# Patient Record
Sex: MALE | Race: WHITE | NOT HISPANIC OR LATINO | Employment: FULL TIME | URBAN - METROPOLITAN AREA
[De-identification: names, ages, dates, MRNs, and addresses within clinical notes are randomized per-mention and may not be internally consistent; named-entity substitution may affect disease eponyms.]

---

## 2019-01-02 ENCOUNTER — OFFICE VISIT (OUTPATIENT)
Dept: URGENT CARE | Facility: CLINIC | Age: 54
End: 2019-01-02
Payer: COMMERCIAL

## 2019-01-02 VITALS
BODY MASS INDEX: 25.46 KG/M2 | HEIGHT: 68 IN | WEIGHT: 168 LBS | RESPIRATION RATE: 16 BRPM | DIASTOLIC BLOOD PRESSURE: 96 MMHG | OXYGEN SATURATION: 100 % | HEART RATE: 70 BPM | SYSTOLIC BLOOD PRESSURE: 145 MMHG | TEMPERATURE: 97 F

## 2019-01-02 DIAGNOSIS — J01.90 ACUTE NON-RECURRENT SINUSITIS, UNSPECIFIED LOCATION: Primary | ICD-10-CM

## 2019-01-02 PROCEDURE — 99204 PR OFFICE/OUTPT VISIT, NEW, LEVL IV, 45-59 MIN: ICD-10-PCS | Mod: 25,S$GLB,, | Performed by: NURSE PRACTITIONER

## 2019-01-02 PROCEDURE — 99204 OFFICE O/P NEW MOD 45 MIN: CPT | Mod: 25,S$GLB,, | Performed by: NURSE PRACTITIONER

## 2019-01-02 PROCEDURE — 96372 THER/PROPH/DIAG INJ SC/IM: CPT | Mod: S$GLB,,, | Performed by: NURSE PRACTITIONER

## 2019-01-02 PROCEDURE — 96372 PR INJECTION,THERAP/PROPH/DIAG2ST, IM OR SUBCUT: ICD-10-PCS | Mod: S$GLB,,, | Performed by: NURSE PRACTITIONER

## 2019-01-02 RX ORDER — PROMETHAZINE HYDROCHLORIDE AND DEXTROMETHORPHAN HYDROBROMIDE 6.25; 15 MG/5ML; MG/5ML
5 SYRUP ORAL EVERY 4 HOURS PRN
Qty: 240 ML | Refills: 0 | Status: SHIPPED | OUTPATIENT
Start: 2019-01-02 | End: 2019-01-12

## 2019-01-02 RX ORDER — PREDNISONE 20 MG/1
20 TABLET ORAL 2 TIMES DAILY
Qty: 10 TABLET | Refills: 0 | Status: SHIPPED | OUTPATIENT
Start: 2019-01-02 | End: 2019-01-07

## 2019-01-02 RX ORDER — PSEUDOEPHEDRINE HCL 120 MG/1
120 TABLET, FILM COATED, EXTENDED RELEASE ORAL
COMMUNITY
End: 2022-08-31

## 2019-01-02 RX ORDER — CEFDINIR 300 MG/1
300 CAPSULE ORAL 2 TIMES DAILY
Qty: 20 CAPSULE | Refills: 0 | Status: SHIPPED | OUTPATIENT
Start: 2019-01-02 | End: 2019-01-12

## 2019-01-02 RX ORDER — FLUTICASONE PROPIONATE 50 MCG
2 SPRAY, SUSPENSION (ML) NASAL 2 TIMES DAILY
Qty: 1 BOTTLE | Refills: 0 | Status: SHIPPED | OUTPATIENT
Start: 2019-01-02 | End: 2022-08-31

## 2019-01-02 RX ORDER — BENAZEPRIL HYDROCHLORIDE 20 MG/1
20 TABLET ORAL DAILY
COMMUNITY
End: 2022-11-02 | Stop reason: SDUPTHER

## 2019-01-02 RX ORDER — DEXAMETHASONE SODIUM PHOSPHATE 4 MG/ML
8 INJECTION, SOLUTION INTRA-ARTICULAR; INTRALESIONAL; INTRAMUSCULAR; INTRAVENOUS; SOFT TISSUE
Status: COMPLETED | OUTPATIENT
Start: 2019-01-02 | End: 2019-01-02

## 2019-01-02 RX ORDER — CETIRIZINE HYDROCHLORIDE 10 MG/1
10 TABLET ORAL DAILY
Qty: 30 TABLET | Refills: 2 | Status: SHIPPED | OUTPATIENT
Start: 2019-01-02 | End: 2022-08-31

## 2019-01-02 RX ADMIN — DEXAMETHASONE SODIUM PHOSPHATE 8 MG: 4 INJECTION, SOLUTION INTRA-ARTICULAR; INTRALESIONAL; INTRAMUSCULAR; INTRAVENOUS; SOFT TISSUE at 11:01

## 2019-01-02 NOTE — PROGRESS NOTES
"Subjective:       Patient ID: Marjorie Hopper is a 53 y.o. male.    Vitals:  height is 5' 8" (1.727 m) and weight is 76.2 kg (168 lb). His oral temperature is 97.4 °F (36.3 °C). His blood pressure is 145/96 (abnormal) and his pulse is 70. His respiration is 16 and oxygen saturation is 100%.     Chief Complaint: Sinus Problem    Sinus Problem   This is a new problem. The current episode started 1 to 4 weeks ago. The problem has been gradually worsening since onset. There has been no fever. Associated symptoms include congestion, coughing, headaches, sinus pressure and sneezing. Pertinent negatives include no chills, shortness of breath or sore throat. Treatments tried: dayquil, sudafed. The treatment provided no relief.       Constitution: Negative for chills, fatigue and fever.   HENT: Positive for congestion, postnasal drip, sinus pain and sinus pressure. Negative for sore throat.    Neck: Negative for painful lymph nodes.   Cardiovascular: Negative for chest pain and leg swelling.   Eyes: Negative for double vision and blurred vision.   Respiratory: Positive for cough. Negative for shortness of breath.    Gastrointestinal: Negative for nausea, vomiting and diarrhea.   Genitourinary: Negative for dysuria, frequency and urgency.   Musculoskeletal: Negative for joint pain, joint swelling, muscle cramps and muscle ache.   Skin: Negative for color change, pale and rash.   Allergic/Immunologic: Positive for sneezing. Negative for seasonal allergies.   Neurological: Positive for headaches. Negative for dizziness, history of vertigo, light-headedness and passing out.   Hematologic/Lymphatic: Negative for swollen lymph nodes, easy bruising/bleeding and history of blood clots. Does not bruise/bleed easily.   Psychiatric/Behavioral: Negative for nervous/anxious, sleep disturbance and depression. The patient is not nervous/anxious.        Objective:      Physical Exam   Constitutional: He is oriented to person, place, and time. " He appears well-developed and well-nourished. He is active and cooperative.   HENT:   Head: Normocephalic and atraumatic.   Right Ear: Hearing, external ear and ear canal normal. A middle ear effusion is present.   Left Ear: Hearing, external ear and ear canal normal. A middle ear effusion is present.   Nose: Mucosal edema and rhinorrhea present. Right sinus exhibits maxillary sinus tenderness. Left sinus exhibits maxillary sinus tenderness.   Mouth/Throat: Uvula is midline and mucous membranes are normal. Posterior oropharyngeal erythema present.   Eyes: Conjunctivae, EOM and lids are normal. Pupils are equal, round, and reactive to light.   Neck: Trachea normal, normal range of motion and phonation normal. Neck supple.   Cardiovascular: Normal rate, regular rhythm, normal heart sounds, intact distal pulses and normal pulses.   Pulmonary/Chest: Effort normal and breath sounds normal.   Abdominal: Soft. Bowel sounds are normal.   Musculoskeletal: Normal range of motion.   Neurological: He is alert and oriented to person, place, and time. He has normal strength. GCS eye subscore is 4. GCS verbal subscore is 5. GCS motor subscore is 6.   Skin: Skin is warm, dry and intact.   Psychiatric: He has a normal mood and affect. His behavior is normal. Judgment and thought content normal.   Nursing note and vitals reviewed.      Assessment:       1. Acute non-recurrent sinusitis, unspecified location        Plan:         Acute non-recurrent sinusitis, unspecified location  -     dexamethasone injection 8 mg  -     cefdinir (OMNICEF) 300 MG capsule; Take 1 capsule (300 mg total) by mouth 2 (two) times daily. for 10 days  Dispense: 20 capsule; Refill: 0  -     cetirizine (ZYRTEC) 10 MG tablet; Take 1 tablet (10 mg total) by mouth once daily.  Dispense: 30 tablet; Refill: 2  -     fluticasone (FLONASE) 50 mcg/actuation nasal spray; 2 sprays (100 mcg total) by Each Nare route 2 (two) times daily.  Dispense: 1 Bottle; Refill: 0  -      predniSONE (DELTASONE) 20 MG tablet; Take 1 tablet (20 mg total) by mouth 2 (two) times daily. for 5 days  Dispense: 10 tablet; Refill: 0  -     promethazine-dextromethorphan (PROMETHAZINE-DM) 6.25-15 mg/5 mL Syrp; Take 5 mLs by mouth every 4 (four) hours as needed.  Dispense: 240 mL; Refill: 0

## 2022-06-01 ENCOUNTER — TELEPHONE (OUTPATIENT)
Dept: SPORTS MEDICINE | Facility: CLINIC | Age: 57
End: 2022-06-01
Payer: COMMERCIAL

## 2022-06-01 ENCOUNTER — TELEPHONE (OUTPATIENT)
Dept: ORTHOPEDICS | Facility: CLINIC | Age: 57
End: 2022-06-01
Payer: COMMERCIAL

## 2022-06-01 DIAGNOSIS — M25.561 RIGHT KNEE PAIN, UNSPECIFIED CHRONICITY: Primary | ICD-10-CM

## 2022-06-01 NOTE — TELEPHONE ENCOUNTER
[facility] for patient Marjorie to call the office, just returning a call.    ----- Message from Renate Godfrey sent at 6/1/2022  4:06 PM CDT -----  Regarding: returning nurse call  Contact: patient  [phone]  please call patient returning call to the nurse concerning x-rays waiting on a call back thanks.

## 2022-06-06 ENCOUNTER — OFFICE VISIT (OUTPATIENT)
Dept: ORTHOPEDICS | Facility: CLINIC | Age: 57
End: 2022-06-06
Payer: COMMERCIAL

## 2022-06-06 VITALS
DIASTOLIC BLOOD PRESSURE: 85 MMHG | HEART RATE: 104 BPM | HEIGHT: 68 IN | SYSTOLIC BLOOD PRESSURE: 136 MMHG | BODY MASS INDEX: 27.13 KG/M2 | WEIGHT: 179 LBS

## 2022-06-06 DIAGNOSIS — M17.11 PRIMARY OSTEOARTHRITIS OF RIGHT KNEE: Primary | ICD-10-CM

## 2022-06-06 DIAGNOSIS — M25.561 RIGHT KNEE PAIN, UNSPECIFIED CHRONICITY: ICD-10-CM

## 2022-06-06 PROCEDURE — 4010F ACE/ARB THERAPY RXD/TAKEN: CPT | Mod: CPTII,S$GLB,, | Performed by: PHYSICIAN ASSISTANT

## 2022-06-06 PROCEDURE — 3079F DIAST BP 80-89 MM HG: CPT | Mod: CPTII,S$GLB,, | Performed by: PHYSICIAN ASSISTANT

## 2022-06-06 PROCEDURE — 99204 PR OFFICE/OUTPT VISIT, NEW, LEVL IV, 45-59 MIN: ICD-10-PCS | Mod: S$GLB,,, | Performed by: PHYSICIAN ASSISTANT

## 2022-06-06 PROCEDURE — 1159F PR MEDICATION LIST DOCUMENTED IN MEDICAL RECORD: ICD-10-PCS | Mod: CPTII,S$GLB,, | Performed by: PHYSICIAN ASSISTANT

## 2022-06-06 PROCEDURE — 1160F RVW MEDS BY RX/DR IN RCRD: CPT | Mod: CPTII,S$GLB,, | Performed by: PHYSICIAN ASSISTANT

## 2022-06-06 PROCEDURE — 3075F SYST BP GE 130 - 139MM HG: CPT | Mod: CPTII,S$GLB,, | Performed by: PHYSICIAN ASSISTANT

## 2022-06-06 PROCEDURE — 99999 PR PBB SHADOW E&M-EST. PATIENT-LVL III: CPT | Mod: PBBFAC,,, | Performed by: PHYSICIAN ASSISTANT

## 2022-06-06 PROCEDURE — 3079F PR MOST RECENT DIASTOLIC BLOOD PRESSURE 80-89 MM HG: ICD-10-PCS | Mod: CPTII,S$GLB,, | Performed by: PHYSICIAN ASSISTANT

## 2022-06-06 PROCEDURE — 1160F PR REVIEW ALL MEDS BY PRESCRIBER/CLIN PHARMACIST DOCUMENTED: ICD-10-PCS | Mod: CPTII,S$GLB,, | Performed by: PHYSICIAN ASSISTANT

## 2022-06-06 PROCEDURE — 99204 OFFICE O/P NEW MOD 45 MIN: CPT | Mod: S$GLB,,, | Performed by: PHYSICIAN ASSISTANT

## 2022-06-06 PROCEDURE — 3008F PR BODY MASS INDEX (BMI) DOCUMENTED: ICD-10-PCS | Mod: CPTII,S$GLB,, | Performed by: PHYSICIAN ASSISTANT

## 2022-06-06 PROCEDURE — 3075F PR MOST RECENT SYSTOLIC BLOOD PRESS GE 130-139MM HG: ICD-10-PCS | Mod: CPTII,S$GLB,, | Performed by: PHYSICIAN ASSISTANT

## 2022-06-06 PROCEDURE — 4010F PR ACE/ARB THEARPY RXD/TAKEN: ICD-10-PCS | Mod: CPTII,S$GLB,, | Performed by: PHYSICIAN ASSISTANT

## 2022-06-06 PROCEDURE — 3008F BODY MASS INDEX DOCD: CPT | Mod: CPTII,S$GLB,, | Performed by: PHYSICIAN ASSISTANT

## 2022-06-06 PROCEDURE — 1159F MED LIST DOCD IN RCRD: CPT | Mod: CPTII,S$GLB,, | Performed by: PHYSICIAN ASSISTANT

## 2022-06-06 PROCEDURE — 99999 PR PBB SHADOW E&M-EST. PATIENT-LVL III: ICD-10-PCS | Mod: PBBFAC,,, | Performed by: PHYSICIAN ASSISTANT

## 2022-06-06 RX ORDER — MELOXICAM 15 MG/1
15 TABLET ORAL DAILY
Qty: 30 TABLET | Refills: 2 | Status: SHIPPED | OUTPATIENT
Start: 2022-06-06 | End: 2023-01-23

## 2022-06-06 NOTE — PROGRESS NOTES
Subjective:          Chief Complaint: Marjorie Hopper is a 56 y.o. male who had concerns including Pain of the Right Knee.    Marjorie Hopper works as a  at [employer]. The intermittent pain started 4 months ago with no LUIGI and is becoming progressively worse. Pain is located over (points to) medial joint line, anterior and posterior right knee. He reports that the pain is a 5 /10 aching and throbbing pain today and not responding adequately to conservative measures which have included activity modifications, rest, and oral medication. Is affecting ADLs and limiting desired level of activity. Denies numbness, tingling, radiation and inability to bear weight.  Pain is 7 /10 at its worst    Mechanical symptoms: none  Subjective instability: (--)   Worse with: increased activity and at the end of the day  Better with rest.   Nocturnal symptoms: (--)    No previous surgeries or recent trauma on bilateral knees            Review of Systems   Constitutional: Negative for chills and fever.   HENT: Negative for congestion and sore throat.    Eyes: Negative for discharge and double vision.   Cardiovascular: Negative for chest pain, palpitations and syncope.   Respiratory: Negative for cough and shortness of breath.    Endocrine: Negative for cold intolerance and heat intolerance.   Skin: Negative for dry skin and rash.   Musculoskeletal: Positive for joint pain.   Gastrointestinal: Negative for abdominal pain, nausea and vomiting.   Neurological: Negative for focal weakness, numbness and paresthesias.                   Objective:        General: Marjorie is well-developed, well-nourished, appears stated age, in no acute distress, alert and oriented to time, place and person.     General    Nursing note and vitals reviewed.  Constitutional: He is oriented to person, place, and time. He appears well-developed and well-nourished. No distress.   Eyes: Conjunctivae and EOM are normal. Pupils are equal, round, and  reactive to light.   Neck: Neck supple. No JVD present.   Cardiovascular: Normal heart sounds and intact distal pulses.    No murmur heard.  Pulmonary/Chest: Effort normal and breath sounds normal. No respiratory distress.   Abdominal: Soft. Bowel sounds are normal. He exhibits no distension. There is no abdominal tenderness.   Neurological: He is alert and oriented to person, place, and time. Coordination normal.   Psychiatric: He has a normal mood and affect. His behavior is normal. Judgment and thought content normal.     General Musculoskeletal Exam   Gait: normal       Right Knee Exam     Inspection   Erythema: absent  Scars: absent  Swelling: absent  Effusion: absent  Deformity: absent  Bruising: absent    Tenderness   The patient is tender to palpation of the patella and medial joint line (superior, VMO).    Range of Motion   Extension: 0   Flexion:  130 (+pain) abnormal     Tests   Meniscus   Chapo:  Medial - negative Lateral - negative  Ligament Examination Lachman: normal (-1 to 2mm) PCL-Posterior Drawer: normal (0 to 2mm)     MCL - Valgus: normal (0 to 2mm)  LCL - Varus: normalDial Test at 90 degrees: normal (< 5 degrees)  Posterolateral Corner: stable  Patella   Patellar Glide (quadrants): Lateral - 1   Medial - 2  Patellar Grind: negative    Other   Popliteal (Baker's) Cyst: absent  Sensation: normal    Left Knee Exam     Inspection   Erythema: absent  Scars: absent  Swelling: absent  Effusion: absent  Deformity: absent  Bruising: absent    Tenderness   The patient is experiencing no tenderness.     Range of Motion   Extension: 0   Flexion: 130     Tests   Meniscus   Chapo:  Medial - negative Lateral - negative  Stability Lachman: normal (-1 to 2mm) PCL-Posterior Drawer: normal (0 to 2mm)  MCL - Valgus: normal (0 to 2mm)  LCL - Varus: normal (0 to 2mm)Dial Test at 90 degrees: normal (< 5 degrees)  Posterolateral Corner: stable  Patella   Patellar Glide (Quadrants): Lateral - 1 Medial - 2  Patellar  Grind: negative    Other   Popliteal (Baker's) Cyst: absent  Sensation: normal    Right Hip Exam     Tests   Blanche: negative  Left Hip Exam     Tests   Blanche: negative          Muscle Strength   Right Lower Extremity   Hip Abduction: 5/5   Quadriceps:  4/5 (mild atrophy)   Hamstrin/5   Left Lower Extremity   Hip Abduction: 5/5   Quadriceps:  4/5 (mild atrophy)   Hamstrin/5     Vascular Exam     Right Pulses  Dorsalis Pedis:      2+  Posterior Tibial:      2+        Left Pulses  Dorsalis Pedis:      2+  Posterior Tibial:      2+        Edema  Right Lower Leg: absent  Left Lower Leg: absent      Radiographic Findings 2022:    Knee joint cartilage spaces appear fairly well maintained for age.  No advanced degenerative abnormality.  Mild enthesopathic change at the bilateral quadriceps insertion.  No acute fracture, dislocation, or osseous destruction.    Kellgren-Rosendo Classification: 2    Xrays of the knees were ordered and reviewed by me today. These findings were discussed and reviewed with the patient.          Assessment:       Encounter Diagnoses   Name Primary?    Primary osteoarthritis of right knee Yes    Right knee pain, unspecified chronicity           Plan:       We have discussed a variety of treatment options including medications, injections, physical therapy and other alternative treatments. I also explained the indications, risks and benefits of surgery. Given the patients hx and examination today, I believe he would benefit from physical therapy. Pt agrees and would like to proceed with physical therapy.    I made the decision to obtain old records of the patient including previous notes and imaging. I independently reviewed and interpreted lab results today as well as prior imaging.      1. Mobic 15 mg 1 time daily PRN for pain management. Patient understands to take with food and/or OTC prilosec to decrease GI side effects.  2. Ambulatory referral to physical therapy for  patellofemoral strengthening and conditioning.  3. Ice compress to the affected area 2-3x a day for 15-20 minutes as needed for pain management.  4. RTC to see Leighton Oviedo PA-C in 8 weeks for follow-up.      All of the patient's questions were answered and the patient will contact us if they have any questions or concerns in the interim.                Patient questionnaires may have been collected.

## 2022-06-15 PROBLEM — M17.11 PRIMARY OSTEOARTHRITIS OF RIGHT KNEE: Status: ACTIVE | Noted: 2022-06-15

## 2022-06-15 PROBLEM — M25.561 RIGHT KNEE PAIN: Status: ACTIVE | Noted: 2022-06-15

## 2022-08-09 ENCOUNTER — OFFICE VISIT (OUTPATIENT)
Dept: PODIATRY | Facility: CLINIC | Age: 57
End: 2022-08-09
Payer: COMMERCIAL

## 2022-08-09 VITALS
DIASTOLIC BLOOD PRESSURE: 86 MMHG | HEART RATE: 92 BPM | BODY MASS INDEX: 27.52 KG/M2 | WEIGHT: 181 LBS | SYSTOLIC BLOOD PRESSURE: 139 MMHG

## 2022-08-09 DIAGNOSIS — M79.671 ARCH PAIN, RIGHT: ICD-10-CM

## 2022-08-09 DIAGNOSIS — M12.861: Primary | ICD-10-CM

## 2022-08-09 PROCEDURE — 3079F PR MOST RECENT DIASTOLIC BLOOD PRESSURE 80-89 MM HG: ICD-10-PCS | Mod: CPTII,S$GLB,, | Performed by: PODIATRIST

## 2022-08-09 PROCEDURE — 3075F PR MOST RECENT SYSTOLIC BLOOD PRESS GE 130-139MM HG: ICD-10-PCS | Mod: CPTII,S$GLB,, | Performed by: PODIATRIST

## 2022-08-09 PROCEDURE — 99203 OFFICE O/P NEW LOW 30 MIN: CPT | Mod: S$GLB,,, | Performed by: PODIATRIST

## 2022-08-09 PROCEDURE — 1159F PR MEDICATION LIST DOCUMENTED IN MEDICAL RECORD: ICD-10-PCS | Mod: CPTII,S$GLB,, | Performed by: PODIATRIST

## 2022-08-09 PROCEDURE — 99999 PR PBB SHADOW E&M-EST. PATIENT-LVL III: ICD-10-PCS | Mod: PBBFAC,,, | Performed by: PODIATRIST

## 2022-08-09 PROCEDURE — 4010F ACE/ARB THERAPY RXD/TAKEN: CPT | Mod: CPTII,S$GLB,, | Performed by: PODIATRIST

## 2022-08-09 PROCEDURE — 4010F PR ACE/ARB THEARPY RXD/TAKEN: ICD-10-PCS | Mod: CPTII,S$GLB,, | Performed by: PODIATRIST

## 2022-08-09 PROCEDURE — 99203 PR OFFICE/OUTPT VISIT, NEW, LEVL III, 30-44 MIN: ICD-10-PCS | Mod: S$GLB,,, | Performed by: PODIATRIST

## 2022-08-09 PROCEDURE — 3008F PR BODY MASS INDEX (BMI) DOCUMENTED: ICD-10-PCS | Mod: CPTII,S$GLB,, | Performed by: PODIATRIST

## 2022-08-09 PROCEDURE — 1160F RVW MEDS BY RX/DR IN RCRD: CPT | Mod: CPTII,S$GLB,, | Performed by: PODIATRIST

## 2022-08-09 PROCEDURE — 3079F DIAST BP 80-89 MM HG: CPT | Mod: CPTII,S$GLB,, | Performed by: PODIATRIST

## 2022-08-09 PROCEDURE — 3075F SYST BP GE 130 - 139MM HG: CPT | Mod: CPTII,S$GLB,, | Performed by: PODIATRIST

## 2022-08-09 PROCEDURE — 1160F PR REVIEW ALL MEDS BY PRESCRIBER/CLIN PHARMACIST DOCUMENTED: ICD-10-PCS | Mod: CPTII,S$GLB,, | Performed by: PODIATRIST

## 2022-08-09 PROCEDURE — 1159F MED LIST DOCD IN RCRD: CPT | Mod: CPTII,S$GLB,, | Performed by: PODIATRIST

## 2022-08-09 PROCEDURE — 3008F BODY MASS INDEX DOCD: CPT | Mod: CPTII,S$GLB,, | Performed by: PODIATRIST

## 2022-08-09 PROCEDURE — 99999 PR PBB SHADOW E&M-EST. PATIENT-LVL III: CPT | Mod: PBBFAC,,, | Performed by: PODIATRIST

## 2022-08-09 RX ORDER — DICLOFENAC SODIUM 10 MG/G
2 GEL TOPICAL 4 TIMES DAILY
Qty: 350 G | Refills: 2 | Status: SHIPPED | OUTPATIENT
Start: 2022-08-09 | End: 2022-08-31

## 2022-08-09 NOTE — PROGRESS NOTES
Subjective:      Patient ID: Marjorie Hopper is a 56 y.o. male.    Chief Complaint: Foot Problem (Arch pain)    Marjorie is a 56 y.o. male who presents to the podiatry clinic  with complaint of  right lower extremity concern - no consistent foot pain but told it rolls in, affecting knees. Also, has been doing PT for R knee pain & developed occasional R arch pain recently. Onset of the symptoms was couple of yrs.knee pain. Current symptoms (only to knees) include: ability to bear weight, but with some pain and worsening symptoms after a period of activity. Symptoms have waxed and waned but are worse overall. Patient has had no prior known foot problems. Evaluation to date: seen for knees & PT 6 wks., then referred here.     Past Medical History:   Diagnosis Date    Hypertension      Patient Active Problem List   Diagnosis    Right knee pain    Primary osteoarthritis of right knee     PCP: Anatoliy Mares MD - 6/9/22 - since, has left so needs new PCP     Objective:      Review of Systems   Constitutional: Negative for malaise/fatigue.   Cardiovascular: Negative for claudication and leg swelling.   Skin: Negative for color change and suspicious lesions.   Musculoskeletal: Positive for joint pain and myalgias. Negative for back pain, falls, joint swelling, muscle cramps and muscle weakness.   Neurological: Negative for focal weakness, numbness, paresthesias, sensory change and weakness.   Psychiatric/Behavioral: The patient is not nervous/anxious.      Physical Exam  Vitals reviewed.   Constitutional:       General: He is not in acute distress.     Appearance: Normal appearance. He is well-developed and overweight.   Cardiovascular:      Pulses: Normal pulses.           Dorsalis pedis pulses are 2+ on the right side and 2+ on the left side.   Musculoskeletal:         General: No swelling, tenderness or signs of injury.      Right lower leg: No edema.      Left lower leg: No edema.      Right foot: Deformity present.       Left foot: Deformity (flexible cavus B/L) present.   Feet:      Right foot:      Skin integrity: Skin integrity normal.      Left foot:      Skin integrity: Skin integrity normal.      Comments: Equinus B/L ankles with < 90 deg foot to leg noted with knees extended.  Musculoskeletal strength of extrinsics to foot and ankle B/L + 5/5 in DF/PF/Inv/Ev to resistance with no reproduction of pain in any direction, including B/L PT tendon.   Passive ROM of ankle and foot joints is painless and w/out crepitation B/L.  Some reduction in MLA contour w/ axial loading & WB such that there is slight heel valgus.  No pain w/ palpation plantar fascial insertion into calcaneus nor along course of plantar fascia R foot; minimal palpable edema planter central heel.      Skin:     General: Skin is dry.      Capillary Refill: Capillary refill takes less than 2 seconds.      Findings: No bruising, erythema, lesion or rash.   Neurological:      Mental Status: He is alert and oriented to person, place, and time.      Sensory: No sensory deficit.      Motor: No weakness.      Gait: Gait normal.   Psychiatric:         Mood and Affect: Mood and affect normal.         Behavior: Behavior normal. Behavior is cooperative.         Assessment:      Encounter Diagnoses   Name Primary?    Transient arthropathy, lower leg, right Yes    Arch pain, right        Plan:       Marjorie was seen today for foot problem.    Diagnoses and all orders for this visit:    Transient arthropathy, lower leg, right  -     diclofenac sodium (VOLTAREN) 1 % Gel; Apply 2 g topically 4 (four) times daily.    Arch pain, right    I counseled the patient on his conditions, their implications and medical management.     Discussed general issues surrounding flexible high arch feet of various tx strategies.  Discussed shoe choice.  Discussed non-prescription inserts &/or orthotics.     - Shoe inspection. Patient instructed on proper supportive shoe gear, never walking without  protective shoe gear.    May purchase [facility] Spenco arch orthotics or like for cavus foot type.    PPT arch pad & 1/2 B/L added to current shoes.    Instructions on shoe insert type, pads provided.    Return prn.

## 2022-08-09 NOTE — PATIENT INSTRUCTIONS
Spenco arch orthotics - 3/4 length or equivalent for high arch/plantar fasciitis    PPT arch pads w/ adhesive - medium & a portion of the small to fill arch.

## 2022-08-31 ENCOUNTER — OFFICE VISIT (OUTPATIENT)
Dept: PRIMARY CARE CLINIC | Facility: CLINIC | Age: 57
End: 2022-08-31
Payer: COMMERCIAL

## 2022-08-31 VITALS
HEIGHT: 68 IN | HEART RATE: 82 BPM | RESPIRATION RATE: 16 BRPM | TEMPERATURE: 98 F | BODY MASS INDEX: 27.52 KG/M2 | SYSTOLIC BLOOD PRESSURE: 130 MMHG | DIASTOLIC BLOOD PRESSURE: 84 MMHG | WEIGHT: 181.56 LBS | OXYGEN SATURATION: 98 %

## 2022-08-31 DIAGNOSIS — G93.31 POSTVIRAL SYNDROME: ICD-10-CM

## 2022-08-31 DIAGNOSIS — R05.9 COUGH: ICD-10-CM

## 2022-08-31 DIAGNOSIS — E78.5 HYPERLIPIDEMIA, UNSPECIFIED HYPERLIPIDEMIA TYPE: ICD-10-CM

## 2022-08-31 DIAGNOSIS — G89.29 CHRONIC LUQ PAIN: ICD-10-CM

## 2022-08-31 DIAGNOSIS — Z13.6 SCREENING FOR CARDIOVASCULAR CONDITION: ICD-10-CM

## 2022-08-31 DIAGNOSIS — Z11.4 ENCOUNTER FOR SCREENING FOR HIV: ICD-10-CM

## 2022-08-31 DIAGNOSIS — Z76.89 ENCOUNTER TO ESTABLISH CARE: Primary | ICD-10-CM

## 2022-08-31 DIAGNOSIS — Z11.59 NEED FOR HEPATITIS C SCREENING TEST: ICD-10-CM

## 2022-08-31 DIAGNOSIS — Z23 NEED FOR VACCINATION: ICD-10-CM

## 2022-08-31 DIAGNOSIS — Z87.891 FORMER TOBACCO USE: ICD-10-CM

## 2022-08-31 DIAGNOSIS — R10.12 CHRONIC LUQ PAIN: ICD-10-CM

## 2022-08-31 DIAGNOSIS — K21.9 GASTROESOPHAGEAL REFLUX DISEASE, UNSPECIFIED WHETHER ESOPHAGITIS PRESENT: ICD-10-CM

## 2022-08-31 PROBLEM — I73.00 RAYNAUD'S PHENOMENON: Status: ACTIVE | Noted: 2020-08-04

## 2022-08-31 PROBLEM — F41.9 ANXIETY DISORDER: Status: ACTIVE | Noted: 2022-08-31

## 2022-08-31 PROBLEM — I10 ESSENTIAL HYPERTENSION: Status: ACTIVE | Noted: 2022-08-31

## 2022-08-31 PROCEDURE — 1159F MED LIST DOCD IN RCRD: CPT | Mod: CPTII,S$GLB,, | Performed by: STUDENT IN AN ORGANIZED HEALTH CARE EDUCATION/TRAINING PROGRAM

## 2022-08-31 PROCEDURE — 99204 PR OFFICE/OUTPT VISIT, NEW, LEVL IV, 45-59 MIN: ICD-10-PCS | Mod: S$GLB,,, | Performed by: STUDENT IN AN ORGANIZED HEALTH CARE EDUCATION/TRAINING PROGRAM

## 2022-08-31 PROCEDURE — 99999 PR PBB SHADOW E&M-EST. PATIENT-LVL V: CPT | Mod: PBBFAC,,, | Performed by: STUDENT IN AN ORGANIZED HEALTH CARE EDUCATION/TRAINING PROGRAM

## 2022-08-31 PROCEDURE — 3079F DIAST BP 80-89 MM HG: CPT | Mod: CPTII,S$GLB,, | Performed by: STUDENT IN AN ORGANIZED HEALTH CARE EDUCATION/TRAINING PROGRAM

## 2022-08-31 PROCEDURE — 3075F PR MOST RECENT SYSTOLIC BLOOD PRESS GE 130-139MM HG: ICD-10-PCS | Mod: CPTII,S$GLB,, | Performed by: STUDENT IN AN ORGANIZED HEALTH CARE EDUCATION/TRAINING PROGRAM

## 2022-08-31 PROCEDURE — 1160F RVW MEDS BY RX/DR IN RCRD: CPT | Mod: CPTII,S$GLB,, | Performed by: STUDENT IN AN ORGANIZED HEALTH CARE EDUCATION/TRAINING PROGRAM

## 2022-08-31 PROCEDURE — 3008F BODY MASS INDEX DOCD: CPT | Mod: CPTII,S$GLB,, | Performed by: STUDENT IN AN ORGANIZED HEALTH CARE EDUCATION/TRAINING PROGRAM

## 2022-08-31 PROCEDURE — 93010 EKG 12-LEAD: ICD-10-PCS | Mod: S$GLB,,, | Performed by: INTERNAL MEDICINE

## 2022-08-31 PROCEDURE — 3075F SYST BP GE 130 - 139MM HG: CPT | Mod: CPTII,S$GLB,, | Performed by: STUDENT IN AN ORGANIZED HEALTH CARE EDUCATION/TRAINING PROGRAM

## 2022-08-31 PROCEDURE — 99204 OFFICE O/P NEW MOD 45 MIN: CPT | Mod: S$GLB,,, | Performed by: STUDENT IN AN ORGANIZED HEALTH CARE EDUCATION/TRAINING PROGRAM

## 2022-08-31 PROCEDURE — 3008F PR BODY MASS INDEX (BMI) DOCUMENTED: ICD-10-PCS | Mod: CPTII,S$GLB,, | Performed by: STUDENT IN AN ORGANIZED HEALTH CARE EDUCATION/TRAINING PROGRAM

## 2022-08-31 PROCEDURE — 1160F PR REVIEW ALL MEDS BY PRESCRIBER/CLIN PHARMACIST DOCUMENTED: ICD-10-PCS | Mod: CPTII,S$GLB,, | Performed by: STUDENT IN AN ORGANIZED HEALTH CARE EDUCATION/TRAINING PROGRAM

## 2022-08-31 PROCEDURE — 99999 PR PBB SHADOW E&M-EST. PATIENT-LVL V: ICD-10-PCS | Mod: PBBFAC,,, | Performed by: STUDENT IN AN ORGANIZED HEALTH CARE EDUCATION/TRAINING PROGRAM

## 2022-08-31 PROCEDURE — 4010F ACE/ARB THERAPY RXD/TAKEN: CPT | Mod: CPTII,S$GLB,, | Performed by: STUDENT IN AN ORGANIZED HEALTH CARE EDUCATION/TRAINING PROGRAM

## 2022-08-31 PROCEDURE — 1159F PR MEDICATION LIST DOCUMENTED IN MEDICAL RECORD: ICD-10-PCS | Mod: CPTII,S$GLB,, | Performed by: STUDENT IN AN ORGANIZED HEALTH CARE EDUCATION/TRAINING PROGRAM

## 2022-08-31 PROCEDURE — 3079F PR MOST RECENT DIASTOLIC BLOOD PRESSURE 80-89 MM HG: ICD-10-PCS | Mod: CPTII,S$GLB,, | Performed by: STUDENT IN AN ORGANIZED HEALTH CARE EDUCATION/TRAINING PROGRAM

## 2022-08-31 PROCEDURE — 93010 ELECTROCARDIOGRAM REPORT: CPT | Mod: S$GLB,,, | Performed by: INTERNAL MEDICINE

## 2022-08-31 PROCEDURE — 93005 EKG 12-LEAD: ICD-10-PCS | Mod: S$GLB,,, | Performed by: STUDENT IN AN ORGANIZED HEALTH CARE EDUCATION/TRAINING PROGRAM

## 2022-08-31 PROCEDURE — 93005 ELECTROCARDIOGRAM TRACING: CPT | Mod: S$GLB,,, | Performed by: STUDENT IN AN ORGANIZED HEALTH CARE EDUCATION/TRAINING PROGRAM

## 2022-08-31 PROCEDURE — 4010F PR ACE/ARB THEARPY RXD/TAKEN: ICD-10-PCS | Mod: CPTII,S$GLB,, | Performed by: STUDENT IN AN ORGANIZED HEALTH CARE EDUCATION/TRAINING PROGRAM

## 2022-08-31 RX ORDER — ZOSTER VACCINE RECOMBINANT, ADJUVANTED 50 MCG/0.5
0.5 KIT INTRAMUSCULAR ONCE
Qty: 1 EACH | Refills: 0 | Status: SHIPPED | OUTPATIENT
Start: 2022-08-31 | End: 2022-08-31

## 2022-08-31 RX ORDER — CODEINE PHOSPHATE AND GUAIFENESIN 10; 100 MG/5ML; MG/5ML
5 SOLUTION ORAL 3 TIMES DAILY PRN
Qty: 180 ML | Refills: 0 | Status: SHIPPED | OUTPATIENT
Start: 2022-08-31 | End: 2023-02-23

## 2022-08-31 RX ORDER — BENZONATATE 100 MG/1
100 CAPSULE ORAL 3 TIMES DAILY PRN
Qty: 50 CAPSULE | Refills: 0 | Status: SHIPPED | OUTPATIENT
Start: 2022-08-31 | End: 2022-09-10

## 2022-08-31 NOTE — PROGRESS NOTES
"Subjective:           Patient ID: Marjorie Hopper is a 56 y.o. male who presents today with a chief complaint of est care.    Chief Complaint:   Establish Care      History of Present Illness:    55yo male presenting to est care, was previously with [facility].  Moved from  about 7 years ago.     Hx of HTN, possible elevated cholesterol.     Left chest wall pain from coughing, extends over sternum at times.    Right knee pain, taking Meloxicam 15mg daily for right knee pain.   Has been taking daily for last couple weeks.     Had pain under the left rib wall b/f COVID infection, this was evaluated in ED and told was possibly a renal stone, CT showed no stones.        Review of Systems   Constitutional: Negative.  Negative for fever.   HENT: Negative.  Negative for congestion, rhinorrhea, sinus pressure and sinus pain.    Eyes: Negative.    Respiratory: Negative.  Negative for cough, shortness of breath and wheezing.    Cardiovascular: Negative.  Negative for chest pain, palpitations and leg swelling.   Gastrointestinal:  Positive for abdominal pain. Negative for constipation, diarrhea, nausea and vomiting.   Endocrine: Negative.    Genitourinary: Negative.  Negative for difficulty urinating and frequency.   Musculoskeletal:  Positive for arthralgias, back pain and joint swelling.   Skin: Negative.    Allergic/Immunologic: Negative for food allergies.   Psychiatric/Behavioral: Negative.           Objective:        Vitals:    08/31/22 1300   BP: 130/84   BP Location: Right arm   Patient Position: Sitting   BP Method: Medium (Manual)   Pulse: 82   Resp: 16   Temp: 98 °F (36.7 °C)   TempSrc: Oral   SpO2: 98%   Weight: 82.3 kg (181 lb 8.8 oz)   Height: 5' 8" (1.727 m)       Body mass index is 27.6 kg/m².      Physical Exam  Vitals reviewed.   Constitutional:       General: He is not in acute distress.     Appearance: Normal appearance. He is obese. He is not ill-appearing.      Comments: As per BMI.   HENT:      Head: " Normocephalic and atraumatic.      Right Ear: External ear normal.      Left Ear: External ear normal.      Nose: Nose normal. No rhinorrhea.      Mouth/Throat:      Pharynx: No posterior oropharyngeal erythema.   Eyes:      Extraocular Movements: Extraocular movements intact.      Conjunctiva/sclera: Conjunctivae normal.   Cardiovascular:      Rate and Rhythm: Normal rate and regular rhythm.      Heart sounds: No murmur heard.  Pulmonary:      Effort: Pulmonary effort is normal. No respiratory distress.      Breath sounds: Rhonchi present. No wheezing.   Abdominal:      General: Bowel sounds are normal.      Tenderness: There is abdominal tenderness. There is no right CVA tenderness or left CVA tenderness.   Musculoskeletal:         General: No swelling or deformity.      Cervical back: Normal range of motion.      Right lower leg: Edema (trace) present.      Left lower leg: Edema (trace) present.   Skin:     Capillary Refill: Capillary refill takes less than 2 seconds.      Findings: No lesion.   Neurological:      General: No focal deficit present.      Mental Status: He is alert and oriented to person, place, and time.      Gait: Gait normal.   Psychiatric:         Mood and Affect: Mood normal.           No results found for: NA, K, CL, CO2, BUN, CREATININE, GLUCOSE, ANIONGAP  No results found for: HGBA1C  No results found for: BNP, BNPTRIAGEBLO    No results found for: WBC, HGB, HCT, PLT, GRAN  No results found for: CHOL, HDL, LDLCALC, TRIG       Current Outpatient Medications:     benazepril (LOTENSIN) 20 MG tablet, Take 20 mg by mouth once daily., Disp: , Rfl:     meloxicam (MOBIC) 15 MG tablet, Take 1 tablet (15 mg total) by mouth once daily., Disp: 30 tablet, Rfl: 2    benzonatate (TESSALON) 100 MG capsule, Take 1 capsule (100 mg total) by mouth 3 (three) times daily as needed for Cough., Disp: 50 capsule, Rfl: 0    guaiFENesin-codeine 100-10 mg/5 ml (TUSSI-ORGANIDIN NR)  mg/5 mL syrup, Take 5 mLs by  mouth 3 (three) times daily as needed for Cough., Disp: 180 mL, Rfl: 0     Outpatient Encounter Medications as of 8/31/2022   Medication Sig Dispense Refill    benazepril (LOTENSIN) 20 MG tablet Take 20 mg by mouth once daily.      meloxicam (MOBIC) 15 MG tablet Take 1 tablet (15 mg total) by mouth once daily. 30 tablet 2    [DISCONTINUED] traMADoL (ULTRAM) 50 mg tablet Take 1 tablet (50 mg total) by mouth every 8 (eight) hours as needed for Pain. 15 tablet 0    benzonatate (TESSALON) 100 MG capsule Take 1 capsule (100 mg total) by mouth 3 (three) times daily as needed for Cough. 50 capsule 0    guaiFENesin-codeine 100-10 mg/5 ml (TUSSI-ORGANIDIN NR)  mg/5 mL syrup Take 5 mLs by mouth 3 (three) times daily as needed for Cough. 180 mL 0    [DISCONTINUED] cetirizine (ZYRTEC) 10 MG tablet Take 1 tablet (10 mg total) by mouth once daily. 30 tablet 2    [DISCONTINUED] dextromethorphan HBr (VICKS DAYQUIL COUGH ORAL) Take by mouth.      [DISCONTINUED] diclofenac sodium (VOLTAREN) 1 % Gel Apply 2 g topically 4 (four) times daily. 350 g 2    [DISCONTINUED] fluticasone (FLONASE) 50 mcg/actuation nasal spray 2 sprays (100 mcg total) by Each Nare route 2 (two) times daily. 1 Bottle 0    [DISCONTINUED] pseudoephedrine (SUDAFED) 120 mg 12 hr tablet Take 120 mg by mouth every 12 (twelve) hours.       No facility-administered encounter medications on file as of 8/31/2022.          Assessment:       1. Encounter to establish care    2. Cough    3. Postviral syndrome    4. Gastroesophageal reflux disease, unspecified whether esophagitis present    5. Hyperlipidemia, unspecified hyperlipidemia type    6. Former tobacco use    7. Screening for cardiovascular condition    8. Need for hepatitis C screening test    9. Encounter for screening for HIV           Plan:       Encounter to establish care    Cough  -     benzonatate (TESSALON) 100 MG capsule; Take 1 capsule (100 mg total) by mouth 3 (three) times daily as needed for Cough.   Dispense: 50 capsule; Refill: 0  -     guaiFENesin-codeine 100-10 mg/5 ml (TUSSI-ORGANIDIN NR)  mg/5 mL syrup; Take 5 mLs by mouth 3 (three) times daily as needed for Cough.  Dispense: 180 mL; Refill: 0    Postviral syndrome  -     benzonatate (TESSALON) 100 MG capsule; Take 1 capsule (100 mg total) by mouth 3 (three) times daily as needed for Cough.  Dispense: 50 capsule; Refill: 0  -     guaiFENesin-codeine 100-10 mg/5 ml (TUSSI-ORGANIDIN NR)  mg/5 mL syrup; Take 5 mLs by mouth 3 (three) times daily as needed for Cough.  Dispense: 180 mL; Refill: 0    Gastroesophageal reflux disease, unspecified whether esophagitis present    Hyperlipidemia, unspecified hyperlipidemia type  -     Lipid Panel; Future; Expected date: 08/31/2022  -     CBC Auto Differential; Future; Expected date: 08/31/2022  -     Comprehensive Metabolic Panel; Future; Expected date: 08/31/2022  -     TSH; Future; Expected date: 08/31/2022  -     EKG 12-lead  -     CT Chest Lung Screening Low Dose; Future; Expected date: 08/31/2022    Former tobacco use  -     CT Chest Lung Screening Low Dose; Future; Expected date: 08/31/2022    Screening for cardiovascular condition  -     Lipid Panel; Future; Expected date: 08/31/2022  -     CBC Auto Differential; Future; Expected date: 08/31/2022  -     Comprehensive Metabolic Panel; Future; Expected date: 08/31/2022  -     TSH; Future; Expected date: 08/31/2022  -     EKG 12-lead    Need for hepatitis C screening test    Encounter for screening for HIV             Est Care:   - 55yo male Presenting for [facility] care appointment.     Post COVID/viral syndrome:   - patient had COVID some weeks ago, has mostly recovered, no fevers or chills.  No body aches.  Is having some persistent cough with throat irritation and tickle.   - providing Tessalon Perles 100-200 mg up to 3 times daily as needed.  This should help with the throat irritation.   - additionally providing guaifenesin with codeine cough  syrup due to patient's costochondritis and chest pain from his forceful cough.    LUQ Pain:   - patient with left upper quadrant pain, states this started before his COVID infection and forceful cough.  Has a CT scan that was performed due to renal stone concerns, that did not show an enlarged spleen, did not show pancreatic concerns or stomach distention.  Additionally did not show renal stones.    Tobacco use Disorder:   - patient has history of smoking, smoked approximately 30 years, and stopped about 10 years ago.   - placing order for low-dose lung CT.    Hyperlipidemia:   - patient's last cholesterol numbers were very elevated at other provider, but patient was not fasting.  Will recheck fasting labs today.    Hypertension:   - patient's blood pressure has been elevated previously, takes benazepril 20 mg daily.   - blood pressure controlled today.  Will check kidney liver function.    Raynaud's syndrome:   - patient states he has been diagnosed previously with Raynaud's, does have his fingers intermittently go tingling in turn white.  Primarily of his left hand.   - has never taken medication for this, though was offered a calcium channel blocker.  Will hold off at this time.   - advised patient he can keep the hands warm, use gloves wash with warm water.  If progressively becomes worse treatment will always be an option.

## 2022-08-31 NOTE — PATIENT INSTRUCTIONS
Est Care:   - 55yo male Presenting for [facility] care appointment.     Post COVID/viral syndrome:   - patient had COVID some weeks ago, has mostly recovered, no fevers or chills.  No body aches.  Is having some persistent cough with throat irritation and tickle.   - providing Tessalon Perles 100-200 mg up to 3 times daily as needed.  This should help with the throat irritation.   - additionally providing guaifenesin with codeine cough syrup due to patient's costochondritis and chest pain from his forceful cough.    LUQ Pain:   - patient with left upper quadrant pain, states this started before his COVID infection and forceful cough.  Has a CT scan that was performed due to renal stone concerns, that did not show an enlarged spleen, did not show pancreatic concerns or stomach distention.  Additionally did not show renal stones.    Tobacco use Disorder:   - patient has history of smoking, smoked approximately 30 years, and stopped about 10 years ago.   - placing order for low-dose lung CT.    Hyperlipidemia:   - patient's last cholesterol numbers were very elevated at other provider, but patient was not fasting.  Will recheck fasting labs today.    Hypertension:   - patient's blood pressure has been elevated previously, takes benazepril 20 mg daily.   - blood pressure controlled today.  Will check kidney liver function.    Raynaud's syndrome:   - patient states he has been diagnosed previously with Raynaud's, does have his fingers intermittently go tingling in turn white.  Primarily of his left hand.   - has never taken medication for this, though was offered a calcium channel blocker.  Will hold off at this time.   - advised patient he can keep the hands warm, use gloves wash with warm water.  If progressively becomes worse treatment will always be an option.

## 2022-09-01 ENCOUNTER — PATIENT MESSAGE (OUTPATIENT)
Dept: ADMINISTRATIVE | Facility: HOSPITAL | Age: 57
End: 2022-09-01
Payer: COMMERCIAL

## 2022-09-02 ENCOUNTER — PATIENT MESSAGE (OUTPATIENT)
Dept: PRIMARY CARE CLINIC | Facility: CLINIC | Age: 57
End: 2022-09-02
Payer: COMMERCIAL

## 2022-09-27 ENCOUNTER — PATIENT MESSAGE (OUTPATIENT)
Dept: PRIMARY CARE CLINIC | Facility: CLINIC | Age: 57
End: 2022-09-27
Payer: COMMERCIAL

## 2022-11-02 ENCOUNTER — PATIENT MESSAGE (OUTPATIENT)
Dept: PRIMARY CARE CLINIC | Facility: CLINIC | Age: 57
End: 2022-11-02
Payer: COMMERCIAL

## 2022-11-02 DIAGNOSIS — I10 ESSENTIAL HYPERTENSION: Primary | ICD-10-CM

## 2022-11-02 RX ORDER — BENAZEPRIL HYDROCHLORIDE 20 MG/1
20 TABLET ORAL DAILY
Qty: 90 TABLET | Refills: 4 | Status: SHIPPED | OUTPATIENT
Start: 2022-11-02 | End: 2022-11-04

## 2022-11-02 NOTE — TELEPHONE ENCOUNTER
No new care gaps identified.  [facility] Embedded Care Gaps. Reference number: [id number]. 11/02/2022   8:41:52 AM TRACYT

## 2022-11-04 ENCOUNTER — TELEPHONE (OUTPATIENT)
Dept: PRIMARY CARE CLINIC | Facility: CLINIC | Age: 57
End: 2022-11-04
Payer: COMMERCIAL

## 2022-11-04 DIAGNOSIS — I10 ESSENTIAL HYPERTENSION: ICD-10-CM

## 2022-11-04 RX ORDER — BENAZEPRIL HYDROCHLORIDE 40 MG/1
40 TABLET ORAL DAILY
Qty: 90 TABLET | Refills: 3 | Status: SHIPPED | OUTPATIENT
Start: 2022-11-04 | End: 2023-11-13

## 2022-11-04 NOTE — TELEPHONE ENCOUNTER
Patient states that his benazepril should be 40 mg.  He did tell us at his first visit it was 20 mg., but has realized from an old RX it should be 40 mg.    Could you send in the 40 mg's for him?

## 2022-11-04 NOTE — TELEPHONE ENCOUNTER
----- Message from Jia Carly sent at 11/4/2022  1:20 PM CDT -----  Contact: Patient, [phone]  Calling because Rx benazepriL (LOTENSIN) 20 MG tablet should be 40 mg. Please correct. Thanks.        [id number] - [place] - [address] AT [facility] [address]  [address] [address]  Phone: [phone] Fax: [phone]

## 2023-01-23 ENCOUNTER — PATIENT OUTREACH (OUTPATIENT)
Dept: ADMINISTRATIVE | Facility: HOSPITAL | Age: 58
End: 2023-01-23
Payer: COMMERCIAL

## 2023-01-23 ENCOUNTER — OFFICE VISIT (OUTPATIENT)
Dept: PRIMARY CARE CLINIC | Facility: CLINIC | Age: 58
End: 2023-01-23
Payer: COMMERCIAL

## 2023-01-23 VITALS
OXYGEN SATURATION: 100 % | TEMPERATURE: 98 F | SYSTOLIC BLOOD PRESSURE: 138 MMHG | HEART RATE: 82 BPM | BODY MASS INDEX: 27.74 KG/M2 | WEIGHT: 183.06 LBS | RESPIRATION RATE: 17 BRPM | DIASTOLIC BLOOD PRESSURE: 88 MMHG | HEIGHT: 68 IN

## 2023-01-23 DIAGNOSIS — H93.8X1 EAR FULLNESS, RIGHT: ICD-10-CM

## 2023-01-23 DIAGNOSIS — I10 ESSENTIAL HYPERTENSION: Primary | ICD-10-CM

## 2023-01-23 DIAGNOSIS — R00.2 PALPITATIONS: ICD-10-CM

## 2023-01-23 DIAGNOSIS — R42 DIZZINESS: ICD-10-CM

## 2023-01-23 DIAGNOSIS — Z23 FLU VACCINE NEED: ICD-10-CM

## 2023-01-23 PROCEDURE — 99214 OFFICE O/P EST MOD 30 MIN: CPT | Mod: 25,S$GLB,, | Performed by: STUDENT IN AN ORGANIZED HEALTH CARE EDUCATION/TRAINING PROGRAM

## 2023-01-23 PROCEDURE — 99214 PR OFFICE/OUTPT VISIT, EST, LEVL IV, 30-39 MIN: ICD-10-PCS | Mod: 25,S$GLB,, | Performed by: STUDENT IN AN ORGANIZED HEALTH CARE EDUCATION/TRAINING PROGRAM

## 2023-01-23 PROCEDURE — 1159F PR MEDICATION LIST DOCUMENTED IN MEDICAL RECORD: ICD-10-PCS | Mod: CPTII,S$GLB,, | Performed by: STUDENT IN AN ORGANIZED HEALTH CARE EDUCATION/TRAINING PROGRAM

## 2023-01-23 PROCEDURE — 3075F PR MOST RECENT SYSTOLIC BLOOD PRESS GE 130-139MM HG: ICD-10-PCS | Mod: CPTII,S$GLB,, | Performed by: STUDENT IN AN ORGANIZED HEALTH CARE EDUCATION/TRAINING PROGRAM

## 2023-01-23 PROCEDURE — 3079F PR MOST RECENT DIASTOLIC BLOOD PRESSURE 80-89 MM HG: ICD-10-PCS | Mod: CPTII,S$GLB,, | Performed by: STUDENT IN AN ORGANIZED HEALTH CARE EDUCATION/TRAINING PROGRAM

## 2023-01-23 PROCEDURE — 1159F MED LIST DOCD IN RCRD: CPT | Mod: CPTII,S$GLB,, | Performed by: STUDENT IN AN ORGANIZED HEALTH CARE EDUCATION/TRAINING PROGRAM

## 2023-01-23 PROCEDURE — 90686 FLU VACCINE (QUAD) GREATER THAN OR EQUAL TO 3YO PRESERVATIVE FREE IM: ICD-10-PCS | Mod: S$GLB,,, | Performed by: STUDENT IN AN ORGANIZED HEALTH CARE EDUCATION/TRAINING PROGRAM

## 2023-01-23 PROCEDURE — 3008F PR BODY MASS INDEX (BMI) DOCUMENTED: ICD-10-PCS | Mod: CPTII,S$GLB,, | Performed by: STUDENT IN AN ORGANIZED HEALTH CARE EDUCATION/TRAINING PROGRAM

## 2023-01-23 PROCEDURE — 3008F BODY MASS INDEX DOCD: CPT | Mod: CPTII,S$GLB,, | Performed by: STUDENT IN AN ORGANIZED HEALTH CARE EDUCATION/TRAINING PROGRAM

## 2023-01-23 PROCEDURE — 99999 PR PBB SHADOW E&M-EST. PATIENT-LVL IV: ICD-10-PCS | Mod: PBBFAC,,, | Performed by: STUDENT IN AN ORGANIZED HEALTH CARE EDUCATION/TRAINING PROGRAM

## 2023-01-23 PROCEDURE — 3075F SYST BP GE 130 - 139MM HG: CPT | Mod: CPTII,S$GLB,, | Performed by: STUDENT IN AN ORGANIZED HEALTH CARE EDUCATION/TRAINING PROGRAM

## 2023-01-23 PROCEDURE — 3079F DIAST BP 80-89 MM HG: CPT | Mod: CPTII,S$GLB,, | Performed by: STUDENT IN AN ORGANIZED HEALTH CARE EDUCATION/TRAINING PROGRAM

## 2023-01-23 PROCEDURE — 90471 IMMUNIZATION ADMIN: CPT | Mod: S$GLB,,, | Performed by: STUDENT IN AN ORGANIZED HEALTH CARE EDUCATION/TRAINING PROGRAM

## 2023-01-23 PROCEDURE — 90686 IIV4 VACC NO PRSV 0.5 ML IM: CPT | Mod: S$GLB,,, | Performed by: STUDENT IN AN ORGANIZED HEALTH CARE EDUCATION/TRAINING PROGRAM

## 2023-01-23 PROCEDURE — 90471 FLU VACCINE (QUAD) GREATER THAN OR EQUAL TO 3YO PRESERVATIVE FREE IM: ICD-10-PCS | Mod: S$GLB,,, | Performed by: STUDENT IN AN ORGANIZED HEALTH CARE EDUCATION/TRAINING PROGRAM

## 2023-01-23 PROCEDURE — 99999 PR PBB SHADOW E&M-EST. PATIENT-LVL IV: CPT | Mod: PBBFAC,,, | Performed by: STUDENT IN AN ORGANIZED HEALTH CARE EDUCATION/TRAINING PROGRAM

## 2023-01-23 RX ORDER — PREDNISONE 5 MG/1
TABLET ORAL
Qty: 7 TABLET | Refills: 0 | Status: SHIPPED | OUTPATIENT
Start: 2023-01-23 | End: 2023-02-23

## 2023-01-23 RX ORDER — AMLODIPINE BESYLATE 2.5 MG/1
2.5 TABLET ORAL DAILY
Qty: 30 TABLET | Refills: 11 | Status: SHIPPED | OUTPATIENT
Start: 2023-01-23 | End: 2023-08-23

## 2023-01-23 NOTE — PATIENT INSTRUCTIONS
HTN:   - has been elevated at home.  On rooming was borderline elevated.   - takes benazepril to 40 mg every morning.   - starting on amlodipine 2.5 mg in the evening.   - will have follow-up appointment 1 month to recheck blood pressures as well as discuss dizziness.   - advise getting US carotids due to pressure and dizziness.     Imbalance:   - patient had presented for dizziness, states that it is really more state of feeling unbalanced.  Is intermittent and recurrent.   - has not had falls.   - had been seen evaluated by ENT to help address this before but did not get full resolution.   - feels that he until his blood pressure is high at times with palpitations and sometimes this does company that dizziness.   - will give patient prednisone 10 mg for 2 days, then 5 mg for 3 days to help reduce inflammation.   - also starting on increased blood pressure control medication.    Vaccine:   - flu shot today.

## 2023-01-23 NOTE — PROGRESS NOTES
"Subjective:           Patient ID: Marjorie Hopper is a 57 y.o. male who presents today with a chief complaint of dizziness, chest pain, ear pain.    Chief Complaint:   Dizziness (*More "off-balance" than dizzy), Chest Pain (Continued from previous visit), and Otalgia      History of Present Illness:    58yo male presents today with a chief complaint of dizziness, chest pain, ear pain.    Home BP measures have been high lately.  BP was 145/105 last week.  This AM was 145/95.    States that he balance feels off lately, like his is unbalanced.  Not really dizziness.  First noticed Friday last week.    Has also felt more fatigued lately.    Feels weak, eats, a bit better but then feels weak again.    Hx ear issues.  States his ear will get very sensitive to the touch as times, does not last long but recurs.  Was seen by ENT for that at a time, then states was sent to another ENT because they did not want to look deep in the ear.  The 2nd clinic did a test including his vision and talked about Otoliths and was given a Zpak that he did not take as they has not suggested he had any infection.     Chest pain, was on left side at last appt, but now moving to the right a bit.      Review of Systems   Constitutional:  Negative for activity change and unexpected weight change.   HENT:  Positive for rhinorrhea. Negative for hearing loss and trouble swallowing.    Eyes:  Negative for discharge and visual disturbance.   Respiratory:  Negative for chest tightness and wheezing.    Cardiovascular:  Positive for chest pain and palpitations.   Gastrointestinal:  Negative for blood in stool, constipation, diarrhea and vomiting.   Endocrine: Positive for polyuria. Negative for polydipsia.   Genitourinary:  Negative for difficulty urinating, hematuria and urgency.   Musculoskeletal:  Negative for arthralgias, joint swelling and neck pain.   Neurological:  Positive for weakness. Negative for headaches.   Psychiatric/Behavioral:  Negative for " "confusion and dysphoric mood.          Objective:        Vitals:    01/23/23 1121 01/23/23 1214   BP: (!) 142/84 138/88   BP Location: Right arm Right arm   Patient Position: Sitting Sitting   BP Method: Medium (Manual) Medium (Manual)   Pulse: 82    Resp: 17    Temp: 97.8 °F (36.6 °C)    TempSrc: Temporal    SpO2: 100%    Weight: 83 kg (183 lb 1.5 oz)    Height: 5' 8" (1.727 m)        Body mass index is 27.84 kg/m².      Physical Exam  Vitals reviewed.   Constitutional:       General: He is not in acute distress.     Appearance: Normal appearance. He is not ill-appearing.      Comments: As per BMI.   HENT:      Head: Normocephalic and atraumatic.      Right Ear: Tympanic membrane and external ear normal.      Left Ear: Tympanic membrane and external ear normal.      Nose: Nose normal.   Eyes:      Extraocular Movements: Extraocular movements intact.      Conjunctiva/sclera: Conjunctivae normal.   Cardiovascular:      Rate and Rhythm: Normal rate.      Pulses: Normal pulses.      Heart sounds: No murmur heard.  Pulmonary:      Effort: Pulmonary effort is normal. No respiratory distress.      Breath sounds: Wheezing (mild, right lower lung) present.   Abdominal:      Tenderness: There is no right CVA tenderness or left CVA tenderness.   Musculoskeletal:         General: No swelling or deformity.      Cervical back: Normal range of motion.      Right lower leg: No edema.      Left lower leg: No edema.   Lymphadenopathy:      Cervical: No cervical adenopathy.   Skin:     Capillary Refill: Capillary refill takes less than 2 seconds.   Neurological:      General: No focal deficit present.      Mental Status: He is alert and oriented to person, place, and time.      Gait: Gait normal.      Comments: Wesson-hallpike exam normal bilaterally.   Psychiatric:         Mood and Affect: Mood normal.           Lab Results   Component Value Date     09/02/2022    K 4.3 09/02/2022     09/02/2022    CO2 24 09/02/2022    BUN " 17 09/02/2022    CREATININE 0.9 09/02/2022    ANIONGAP 9 09/02/2022     No results found for: HGBA1C  No results found for: BNP, BNPTRIAGEBLO    Lab Results   Component Value Date    WBC 6.31 09/02/2022    HGB 15.7 09/02/2022    HCT 47.1 09/02/2022     09/02/2022    GRAN 3.3 09/02/2022    GRAN 52.7 09/02/2022     Lab Results   Component Value Date    CHOL 220 (H) 09/02/2022    HDL 50 09/02/2022    LDLCALC 147.0 09/02/2022    TRIG 115 09/02/2022          Current Outpatient Medications:     benazepriL (LOTENSIN) 40 MG tablet, Take 1 tablet (40 mg total) by mouth once daily., Disp: 90 tablet, Rfl: 3    amLODIPine (NORVASC) 2.5 MG tablet, Take 1 tablet (2.5 mg total) by mouth once daily., Disp: 30 tablet, Rfl: 11    guaiFENesin-codeine 100-10 mg/5 ml (TUSSI-ORGANIDIN NR)  mg/5 mL syrup, Take 5 mLs by mouth 3 (three) times daily as needed for Cough. (Patient not taking: Reported on 1/23/2023), Disp: 180 mL, Rfl: 0    predniSONE (DELTASONE) 5 MG tablet, Take 2 tabs daily for 2 days, then one daily for 3 days., Disp: 7 tablet, Rfl: 0     Outpatient Encounter Medications as of 1/23/2023   Medication Sig Dispense Refill    benazepriL (LOTENSIN) 40 MG tablet Take 1 tablet (40 mg total) by mouth once daily. 90 tablet 3    amLODIPine (NORVASC) 2.5 MG tablet Take 1 tablet (2.5 mg total) by mouth once daily. 30 tablet 11    guaiFENesin-codeine 100-10 mg/5 ml (TUSSI-ORGANIDIN NR)  mg/5 mL syrup Take 5 mLs by mouth 3 (three) times daily as needed for Cough. (Patient not taking: Reported on 1/23/2023) 180 mL 0    predniSONE (DELTASONE) 5 MG tablet Take 2 tabs daily for 2 days, then one daily for 3 days. 7 tablet 0    [DISCONTINUED] meloxicam (MOBIC) 15 MG tablet Take 1 tablet (15 mg total) by mouth once daily. 30 tablet 2     No facility-administered encounter medications on file as of 1/23/2023.          Assessment:       1. Essential hypertension    2. Palpitations    3. Dizziness    4. Ear fullness, right    5.  Flu vaccine need           Plan:       Essential hypertension  -     amLODIPine (NORVASC) 2.5 MG tablet; Take 1 tablet (2.5 mg total) by mouth once daily.  Dispense: 30 tablet; Refill: 11  -     US Carotid Bilateral; Future; Expected date: 01/23/2023    Palpitations  -     US Carotid Bilateral; Future; Expected date: 01/23/2023    Dizziness  -     predniSONE (DELTASONE) 5 MG tablet; Take 2 tabs daily for 2 days, then one daily for 3 days.  Dispense: 7 tablet; Refill: 0  -     US Carotid Bilateral; Future; Expected date: 01/23/2023    Ear fullness, right  -     predniSONE (DELTASONE) 5 MG tablet; Take 2 tabs daily for 2 days, then one daily for 3 days.  Dispense: 7 tablet; Refill: 0    Flu vaccine need  -     Influenza - Quadrivalent *Preferred* (6 months+) (PF)               HTN:   - has been elevated at home.  On rooming was borderline elevated.   - takes benazepril to 40 mg every morning.   - starting on amlodipine 2.5 mg in the evening.   - will have follow-up appointment 1 month to recheck blood pressures as well as discuss dizziness.   - advise getting US carotids due to pressure and dizziness.     Imbalance:   - patient had presented for dizziness, states that it is really more state of feeling unbalanced.  Is intermittent and recurrent.   - has not had falls.   - had been seen evaluated by ENT to help address this before but did not get full resolution.   - feels that he until his blood pressure is high at times with palpitations and sometimes this does company that dizziness.   - will give patient prednisone 10 mg for 2 days, then 5 mg for 3 days to help reduce inflammation.   - also starting on increased blood pressure control medication.    Vaccine:   - flu shot today.

## 2023-01-23 NOTE — PROGRESS NOTES
Health Maintenance Due   Topic Date Due    COVID-19 Vaccine (1) Never done    Shingles Vaccine (2 of 2) 08/04/2022    Influenza Vaccine (1) 09/01/2022        Chart review done.   HM updated.   Immunizations reviewed & updated.   Care Everywhere updated.  [facility] reviewed   Orders Entered:  NA

## 2023-02-23 ENCOUNTER — OFFICE VISIT (OUTPATIENT)
Dept: PRIMARY CARE CLINIC | Facility: CLINIC | Age: 58
End: 2023-02-23
Payer: COMMERCIAL

## 2023-02-23 VITALS
OXYGEN SATURATION: 99 % | HEART RATE: 82 BPM | DIASTOLIC BLOOD PRESSURE: 64 MMHG | HEIGHT: 68 IN | SYSTOLIC BLOOD PRESSURE: 118 MMHG | BODY MASS INDEX: 27.48 KG/M2 | WEIGHT: 181.31 LBS | RESPIRATION RATE: 18 BRPM | TEMPERATURE: 98 F

## 2023-02-23 DIAGNOSIS — I10 ESSENTIAL HYPERTENSION: Primary | ICD-10-CM

## 2023-02-23 DIAGNOSIS — R42 DIZZINESS: ICD-10-CM

## 2023-02-23 PROCEDURE — 1159F MED LIST DOCD IN RCRD: CPT | Mod: CPTII,S$GLB,, | Performed by: STUDENT IN AN ORGANIZED HEALTH CARE EDUCATION/TRAINING PROGRAM

## 2023-02-23 PROCEDURE — 3078F DIAST BP <80 MM HG: CPT | Mod: CPTII,S$GLB,, | Performed by: STUDENT IN AN ORGANIZED HEALTH CARE EDUCATION/TRAINING PROGRAM

## 2023-02-23 PROCEDURE — 3074F PR MOST RECENT SYSTOLIC BLOOD PRESSURE < 130 MM HG: ICD-10-PCS | Mod: CPTII,S$GLB,, | Performed by: STUDENT IN AN ORGANIZED HEALTH CARE EDUCATION/TRAINING PROGRAM

## 2023-02-23 PROCEDURE — 3008F BODY MASS INDEX DOCD: CPT | Mod: CPTII,S$GLB,, | Performed by: STUDENT IN AN ORGANIZED HEALTH CARE EDUCATION/TRAINING PROGRAM

## 2023-02-23 PROCEDURE — 99213 OFFICE O/P EST LOW 20 MIN: CPT | Mod: S$GLB,,, | Performed by: STUDENT IN AN ORGANIZED HEALTH CARE EDUCATION/TRAINING PROGRAM

## 2023-02-23 PROCEDURE — 99999 PR PBB SHADOW E&M-EST. PATIENT-LVL IV: ICD-10-PCS | Mod: PBBFAC,,, | Performed by: STUDENT IN AN ORGANIZED HEALTH CARE EDUCATION/TRAINING PROGRAM

## 2023-02-23 PROCEDURE — 3008F PR BODY MASS INDEX (BMI) DOCUMENTED: ICD-10-PCS | Mod: CPTII,S$GLB,, | Performed by: STUDENT IN AN ORGANIZED HEALTH CARE EDUCATION/TRAINING PROGRAM

## 2023-02-23 PROCEDURE — 1159F PR MEDICATION LIST DOCUMENTED IN MEDICAL RECORD: ICD-10-PCS | Mod: CPTII,S$GLB,, | Performed by: STUDENT IN AN ORGANIZED HEALTH CARE EDUCATION/TRAINING PROGRAM

## 2023-02-23 PROCEDURE — 99999 PR PBB SHADOW E&M-EST. PATIENT-LVL IV: CPT | Mod: PBBFAC,,, | Performed by: STUDENT IN AN ORGANIZED HEALTH CARE EDUCATION/TRAINING PROGRAM

## 2023-02-23 PROCEDURE — 3078F PR MOST RECENT DIASTOLIC BLOOD PRESSURE < 80 MM HG: ICD-10-PCS | Mod: CPTII,S$GLB,, | Performed by: STUDENT IN AN ORGANIZED HEALTH CARE EDUCATION/TRAINING PROGRAM

## 2023-02-23 PROCEDURE — 4010F ACE/ARB THERAPY RXD/TAKEN: CPT | Mod: CPTII,S$GLB,, | Performed by: STUDENT IN AN ORGANIZED HEALTH CARE EDUCATION/TRAINING PROGRAM

## 2023-02-23 PROCEDURE — 1160F RVW MEDS BY RX/DR IN RCRD: CPT | Mod: CPTII,S$GLB,, | Performed by: STUDENT IN AN ORGANIZED HEALTH CARE EDUCATION/TRAINING PROGRAM

## 2023-02-23 PROCEDURE — 1160F PR REVIEW ALL MEDS BY PRESCRIBER/CLIN PHARMACIST DOCUMENTED: ICD-10-PCS | Mod: CPTII,S$GLB,, | Performed by: STUDENT IN AN ORGANIZED HEALTH CARE EDUCATION/TRAINING PROGRAM

## 2023-02-23 PROCEDURE — 99213 PR OFFICE/OUTPT VISIT, EST, LEVL III, 20-29 MIN: ICD-10-PCS | Mod: S$GLB,,, | Performed by: STUDENT IN AN ORGANIZED HEALTH CARE EDUCATION/TRAINING PROGRAM

## 2023-02-23 PROCEDURE — 4010F PR ACE/ARB THEARPY RXD/TAKEN: ICD-10-PCS | Mod: CPTII,S$GLB,, | Performed by: STUDENT IN AN ORGANIZED HEALTH CARE EDUCATION/TRAINING PROGRAM

## 2023-02-23 PROCEDURE — 3074F SYST BP LT 130 MM HG: CPT | Mod: CPTII,S$GLB,, | Performed by: STUDENT IN AN ORGANIZED HEALTH CARE EDUCATION/TRAINING PROGRAM

## 2023-02-23 RX ORDER — SCOLOPAMINE TRANSDERMAL SYSTEM 1 MG/1
1 PATCH, EXTENDED RELEASE TRANSDERMAL
Qty: 4 PATCH | Refills: 2 | Status: SHIPPED | OUTPATIENT
Start: 2023-02-23 | End: 2023-08-23

## 2023-02-23 RX ORDER — CETIRIZINE HYDROCHLORIDE 10 MG/1
10 TABLET ORAL DAILY PRN
COMMUNITY
Start: 2023-01-31 | End: 2023-08-23

## 2023-02-23 NOTE — PATIENT INSTRUCTIONS
Essential hypertension   - continue taking Amlodipine 2.5mg in the PM.    - continue taking Benazpril 40mg in AM.   - has been well controlled.  Monitoring at home in the AM.  BP in PM can be 130s/85 or sometimes a bit lower, but not higher.       Dizziness:   - had US carotids that was wnl.   - improving a bit with better BP.   - labs reassuring.  Using antihistamine.   - will trial an anti-dizziness/motion sickness med.  -     scopolamine (TRANSDERM-SCOP) 1.3-1.5 mg (1 mg over 3 days); Place 1 patch onto the skin every 72 hours as needed (dizziness, imbalance).  Dispense: 4 patch; Refill: 2

## 2023-02-23 NOTE — PROGRESS NOTES
"Subjective:           Patient ID: Marjorie Hopper is a 57 y.o. male who presents today with a chief complaint of f/u appt.    Chief Complaint:   Follow-up (Pt in for a follow-up)      History of Present Illness:    HTN:  BP has been 130s/80s at home.  Taking Benzapril 40mg daily and Amlodipine 2.5mg daily.     Imbalance:   - has intermittent s/s, present at times.    - states that s/s have not been a frequent in the last months since BP better controlled but still feels equilibrium is off at times.    - has not found anything to help with s/s.  Will sit for a minute then just keep going.     Labs from last clinic:    A1c with [facility] 5.1% on 8/3/2020.      Review of Systems   Constitutional:  Negative for activity change and unexpected weight change.   HENT:  Negative for hearing loss, rhinorrhea and trouble swallowing.    Eyes:  Negative for discharge and visual disturbance.   Respiratory:  Negative for chest tightness and wheezing.    Cardiovascular:  Positive for chest pain and palpitations.   Gastrointestinal:  Negative for blood in stool, constipation, diarrhea and vomiting.   Endocrine: Negative for polydipsia and polyuria.   Genitourinary:  Negative for difficulty urinating, hematuria and urgency.   Musculoskeletal:  Negative for arthralgias, joint swelling and neck pain.   Neurological:  Positive for weakness. Negative for headaches.   Psychiatric/Behavioral:  Negative for confusion and dysphoric mood.          Objective:        Vitals:    02/23/23 1131   BP: 118/64   BP Location: Right arm   Patient Position: Sitting   BP Method: Medium (Manual)   Pulse: 82   Resp: 18   Temp: 98.2 °F (36.8 °C)   TempSrc: Temporal   SpO2: 99%   Weight: 82.2 kg (181 lb 5.3 oz)   Height: 5' 8" (1.727 m)       Body mass index is 27.57 kg/m².      Physical Exam  Vitals reviewed.   Constitutional:       General: He is not in acute distress.     Appearance: Normal appearance. He is not ill-appearing.      Comments: As per BMI. "   HENT:      Head: Normocephalic and atraumatic.      Right Ear: Tympanic membrane and external ear normal.      Left Ear: Tympanic membrane and external ear normal.      Nose: Nose normal.   Eyes:      Extraocular Movements: Extraocular movements intact.      Conjunctiva/sclera: Conjunctivae normal.   Cardiovascular:      Rate and Rhythm: Normal rate.      Pulses: Normal pulses.      Heart sounds: No murmur heard.  Pulmonary:      Effort: Pulmonary effort is normal. No respiratory distress.      Breath sounds: No wheezing.   Abdominal:      Tenderness: There is no right CVA tenderness or left CVA tenderness.   Musculoskeletal:         General: No swelling or deformity.      Cervical back: Normal range of motion.      Right lower leg: No edema.      Left lower leg: No edema.   Lymphadenopathy:      Cervical: No cervical adenopathy.   Skin:     Capillary Refill: Capillary refill takes less than 2 seconds.   Neurological:      General: No focal deficit present.      Mental Status: He is alert and oriented to person, place, and time.      Gait: Gait normal.      Comments: Pavan-hallpike exam normal bilaterally.   Psychiatric:         Mood and Affect: Mood normal.           Lab Results   Component Value Date     09/02/2022    K 4.3 09/02/2022     09/02/2022    CO2 24 09/02/2022    BUN 17 09/02/2022    CREATININE 0.9 09/02/2022    ANIONGAP 9 09/02/2022     No results found for: HGBA1C  No results found for: BNP, BNPTRIAGEBLO    Lab Results   Component Value Date    WBC 6.31 09/02/2022    HGB 15.7 09/02/2022    HCT 47.1 09/02/2022     09/02/2022    GRAN 3.3 09/02/2022    GRAN 52.7 09/02/2022     Lab Results   Component Value Date    CHOL 220 (H) 09/02/2022    HDL 50 09/02/2022    LDLCALC 147.0 09/02/2022    TRIG 115 09/02/2022          Current Outpatient Medications:     amLODIPine (NORVASC) 2.5 MG tablet, Take 1 tablet (2.5 mg total) by mouth once daily., Disp: 30 tablet, Rfl: 11    benazepriL (LOTENSIN)  40 MG tablet, Take 1 tablet (40 mg total) by mouth once daily., Disp: 90 tablet, Rfl: 3    cetirizine (ZYRTEC) 10 MG tablet, Take 10 mg by mouth daily as needed., Disp: , Rfl:     scopolamine (TRANSDERM-SCOP) 1.3-1.5 mg (1 mg over 3 days), Place 1 patch onto the skin every 72 hours as needed (dizziness, imbalance)., Disp: 4 patch, Rfl: 2     Outpatient Encounter Medications as of 2/23/2023   Medication Sig Dispense Refill    amLODIPine (NORVASC) 2.5 MG tablet Take 1 tablet (2.5 mg total) by mouth once daily. 30 tablet 11    benazepriL (LOTENSIN) 40 MG tablet Take 1 tablet (40 mg total) by mouth once daily. 90 tablet 3    cetirizine (ZYRTEC) 10 MG tablet Take 10 mg by mouth daily as needed.      scopolamine (TRANSDERM-SCOP) 1.3-1.5 mg (1 mg over 3 days) Place 1 patch onto the skin every 72 hours as needed (dizziness, imbalance). 4 patch 2    [DISCONTINUED] guaiFENesin-codeine 100-10 mg/5 ml (TUSSI-ORGANIDIN NR)  mg/5 mL syrup Take 5 mLs by mouth 3 (three) times daily as needed for Cough. (Patient not taking: Reported on 1/23/2023) 180 mL 0    [DISCONTINUED] predniSONE (DELTASONE) 5 MG tablet Take 2 tabs daily for 2 days, then one daily for 3 days. 7 tablet 0     No facility-administered encounter medications on file as of 2/23/2023.          Assessment:       1. Essential hypertension    2. Dizziness           Plan:       Essential hypertension    Dizziness  -     scopolamine (TRANSDERM-SCOP) 1.3-1.5 mg (1 mg over 3 days); Place 1 patch onto the skin every 72 hours as needed (dizziness, imbalance).  Dispense: 4 patch; Refill: 2               Essential hypertension   - continue taking Amlodipine 2.5mg in the PM.    - continue taking Benazpril 40mg in AM.   - has been well controlled.  Monitoring at home in the AM.  BP in PM can be 130s/85 or sometimes a bit lower, but not higher.       Dizziness:   - had US carotids that was wnl.   - improving a bit with better BP.   - labs reassuring.  Using antihistamine.   -  will trial an anti-dizziness/motion sickness med.  -     scopolamine (TRANSDERM-SCOP) 1.3-1.5 mg (1 mg over 3 days); Place 1 patch onto the skin every 72 hours as needed (dizziness, imbalance).  Dispense: 4 patch; Refill: 2

## 2023-08-23 ENCOUNTER — OFFICE VISIT (OUTPATIENT)
Dept: PRIMARY CARE CLINIC | Facility: CLINIC | Age: 58
End: 2023-08-23
Payer: COMMERCIAL

## 2023-08-23 VITALS
RESPIRATION RATE: 16 BRPM | WEIGHT: 183.19 LBS | HEART RATE: 81 BPM | BODY MASS INDEX: 27.76 KG/M2 | TEMPERATURE: 98 F | HEIGHT: 68 IN | SYSTOLIC BLOOD PRESSURE: 122 MMHG | DIASTOLIC BLOOD PRESSURE: 78 MMHG | OXYGEN SATURATION: 97 %

## 2023-08-23 DIAGNOSIS — Z00.00 ANNUAL PHYSICAL EXAM: ICD-10-CM

## 2023-08-23 DIAGNOSIS — E74.819 DISORDERS OF GLUCOSE TRANSPORT, UNSPECIFIED: ICD-10-CM

## 2023-08-23 DIAGNOSIS — L98.9 SCALP LESION: ICD-10-CM

## 2023-08-23 DIAGNOSIS — E78.5 HYPERLIPIDEMIA, UNSPECIFIED HYPERLIPIDEMIA TYPE: ICD-10-CM

## 2023-08-23 DIAGNOSIS — I10 ESSENTIAL HYPERTENSION: Primary | ICD-10-CM

## 2023-08-23 PROCEDURE — 99396 PR PREVENTIVE VISIT,EST,40-64: ICD-10-PCS | Mod: S$GLB,,, | Performed by: STUDENT IN AN ORGANIZED HEALTH CARE EDUCATION/TRAINING PROGRAM

## 2023-08-23 PROCEDURE — 99396 PREV VISIT EST AGE 40-64: CPT | Mod: S$GLB,,, | Performed by: STUDENT IN AN ORGANIZED HEALTH CARE EDUCATION/TRAINING PROGRAM

## 2023-08-23 PROCEDURE — 3078F PR MOST RECENT DIASTOLIC BLOOD PRESSURE < 80 MM HG: ICD-10-PCS | Mod: CPTII,S$GLB,, | Performed by: STUDENT IN AN ORGANIZED HEALTH CARE EDUCATION/TRAINING PROGRAM

## 2023-08-23 PROCEDURE — 3074F PR MOST RECENT SYSTOLIC BLOOD PRESSURE < 130 MM HG: ICD-10-PCS | Mod: CPTII,S$GLB,, | Performed by: STUDENT IN AN ORGANIZED HEALTH CARE EDUCATION/TRAINING PROGRAM

## 2023-08-23 PROCEDURE — 4010F PR ACE/ARB THEARPY RXD/TAKEN: ICD-10-PCS | Mod: CPTII,S$GLB,, | Performed by: STUDENT IN AN ORGANIZED HEALTH CARE EDUCATION/TRAINING PROGRAM

## 2023-08-23 PROCEDURE — 4010F ACE/ARB THERAPY RXD/TAKEN: CPT | Mod: CPTII,S$GLB,, | Performed by: STUDENT IN AN ORGANIZED HEALTH CARE EDUCATION/TRAINING PROGRAM

## 2023-08-23 PROCEDURE — 99999 PR PBB SHADOW E&M-EST. PATIENT-LVL V: ICD-10-PCS | Mod: PBBFAC,,, | Performed by: STUDENT IN AN ORGANIZED HEALTH CARE EDUCATION/TRAINING PROGRAM

## 2023-08-23 PROCEDURE — 3078F DIAST BP <80 MM HG: CPT | Mod: CPTII,S$GLB,, | Performed by: STUDENT IN AN ORGANIZED HEALTH CARE EDUCATION/TRAINING PROGRAM

## 2023-08-23 PROCEDURE — 99999 PR PBB SHADOW E&M-EST. PATIENT-LVL V: CPT | Mod: PBBFAC,,, | Performed by: STUDENT IN AN ORGANIZED HEALTH CARE EDUCATION/TRAINING PROGRAM

## 2023-08-23 PROCEDURE — 3008F BODY MASS INDEX DOCD: CPT | Mod: CPTII,S$GLB,, | Performed by: STUDENT IN AN ORGANIZED HEALTH CARE EDUCATION/TRAINING PROGRAM

## 2023-08-23 PROCEDURE — 1159F MED LIST DOCD IN RCRD: CPT | Mod: CPTII,S$GLB,, | Performed by: STUDENT IN AN ORGANIZED HEALTH CARE EDUCATION/TRAINING PROGRAM

## 2023-08-23 PROCEDURE — 3074F SYST BP LT 130 MM HG: CPT | Mod: CPTII,S$GLB,, | Performed by: STUDENT IN AN ORGANIZED HEALTH CARE EDUCATION/TRAINING PROGRAM

## 2023-08-23 PROCEDURE — 3008F PR BODY MASS INDEX (BMI) DOCUMENTED: ICD-10-PCS | Mod: CPTII,S$GLB,, | Performed by: STUDENT IN AN ORGANIZED HEALTH CARE EDUCATION/TRAINING PROGRAM

## 2023-08-23 PROCEDURE — 1159F PR MEDICATION LIST DOCUMENTED IN MEDICAL RECORD: ICD-10-PCS | Mod: CPTII,S$GLB,, | Performed by: STUDENT IN AN ORGANIZED HEALTH CARE EDUCATION/TRAINING PROGRAM

## 2023-08-23 NOTE — PROGRESS NOTES
"Subjective:           Patient ID: Marjorie Hopper is a 57 y.o. male who presents today with a chief complaint of Follow-up (Dizziness & HTN) and tacycardia.    Chief Complaint:   Follow-up (Dizziness & HTN) and tacycardia      History of Present Illness:    57 y.o. male who presents today with a chief complaint of Follow-up (Dizziness & HTN) and tacycardia.    Had been taking his Benzepril 40mg in the AM.  Was taking the Amlodipine 2.5mg in the evening when pressure was 140-150s/90s.  When was 120s had held the medications.           Review of Systems   Constitutional:  Negative for activity change and unexpected weight change.   HENT:  Negative for hearing loss, rhinorrhea and trouble swallowing.    Eyes:  Negative for discharge and visual disturbance.   Respiratory:  Negative for chest tightness and wheezing.    Cardiovascular:  Positive for chest pain and palpitations.   Gastrointestinal:  Negative for blood in stool, constipation, diarrhea and vomiting.   Endocrine: Negative for polydipsia and polyuria.   Genitourinary:  Negative for difficulty urinating, hematuria and urgency.   Musculoskeletal:  Negative for arthralgias, joint swelling and neck pain.   Neurological:  Positive for weakness. Negative for headaches.   Psychiatric/Behavioral:  Negative for confusion and dysphoric mood.            Objective:        Vitals:    08/23/23 1429   BP: 122/78   BP Location: Right arm   Patient Position: Sitting   BP Method: Medium (Manual)   Pulse: 106   Resp: 16   Temp: 97.8 °F (36.6 °C)   TempSrc: Temporal   SpO2: 97%   Weight: 83.1 kg (183 lb 3.2 oz)   Height: 5' 8" (1.727 m)       Body mass index is 27.86 kg/m².      Physical Exam  Vitals reviewed.   Constitutional:       General: He is not in acute distress.     Appearance: Normal appearance. He is not ill-appearing.      Comments: As per BMI.   HENT:      Head: Normocephalic and atraumatic.      Right Ear: Tympanic membrane and external ear normal.      Left Ear: " "Tympanic membrane and external ear normal.      Nose: Nose normal.   Eyes:      Extraocular Movements: Extraocular movements intact.      Conjunctiva/sclera: Conjunctivae normal.   Cardiovascular:      Rate and Rhythm: Normal rate and regular rhythm.      Pulses: Normal pulses.      Heart sounds: No murmur heard.  Pulmonary:      Effort: Pulmonary effort is normal. No respiratory distress.      Breath sounds: No wheezing.   Abdominal:      Tenderness: There is no right CVA tenderness or left CVA tenderness.   Musculoskeletal:         General: No swelling or deformity.      Cervical back: Normal range of motion.      Right lower leg: No edema.      Left lower leg: No edema.   Lymphadenopathy:      Cervical: No cervical adenopathy.   Skin:     Capillary Refill: Capillary refill takes less than 2 seconds.      Findings: Lesion (mole to top of scalp as imaged.) present.   Neurological:      General: No focal deficit present.      Mental Status: He is alert and oriented to person, place, and time.      Gait: Gait normal.   Psychiatric:         Mood and Affect: Mood normal.             Lab Results   Component Value Date     09/02/2022    K 4.3 09/02/2022     09/02/2022    CO2 24 09/02/2022    BUN 17 09/02/2022    CREATININE 0.9 09/02/2022    ANIONGAP 9 09/02/2022     No results found for: "HGBA1C"  No results found for: "BNP", "BNPTRIAGEBLO"    Lab Results   Component Value Date    WBC 6.31 09/02/2022    HGB 15.7 09/02/2022    HCT 47.1 09/02/2022     09/02/2022    GRAN 3.3 09/02/2022    GRAN 52.7 09/02/2022     Lab Results   Component Value Date    CHOL 220 (H) 09/02/2022    HDL 50 09/02/2022    LDLCALC 147.0 09/02/2022    TRIG 115 09/02/2022          Current Outpatient Medications:     benazepriL (LOTENSIN) 40 MG tablet, Take 1 tablet (40 mg total) by mouth once daily., Disp: 90 tablet, Rfl: 3     Outpatient Encounter Medications as of 8/23/2023   Medication Sig Dispense Refill    benazepriL (LOTENSIN) " 40 MG tablet Take 1 tablet (40 mg total) by mouth once daily. 90 tablet 3    [DISCONTINUED] amLODIPine (NORVASC) 2.5 MG tablet Take 1 tablet (2.5 mg total) by mouth once daily. (Patient not taking: Reported on 8/23/2023) 30 tablet 11    [DISCONTINUED] cetirizine (ZYRTEC) 10 MG tablet Take 10 mg by mouth daily as needed.      [DISCONTINUED] scopolamine (TRANSDERM-SCOP) 1.3-1.5 mg (1 mg over 3 days) Place 1 patch onto the skin every 72 hours as needed (dizziness, imbalance). 4 patch 2     No facility-administered encounter medications on file as of 8/23/2023.          Assessment:       1. Essential hypertension    2. Scalp lesion    3. Hyperlipidemia, unspecified hyperlipidemia type    4. Disorders of glucose transport, unspecified    5. Annual physical exam           Plan:       Essential hypertension  -     CBC Auto Differential; Future; Expected date: 08/23/2023  -     Comprehensive Metabolic Panel; Future; Expected date: 08/23/2023  -     Lipid Panel; Future; Expected date: 08/23/2023  -     TSH; Future; Expected date: 08/23/2023    Scalp lesion  -     Ambulatory referral/consult to Dermatology; Future; Expected date: 08/30/2023    Hyperlipidemia, unspecified hyperlipidemia type  -     CBC Auto Differential; Future; Expected date: 08/23/2023  -     Comprehensive Metabolic Panel; Future; Expected date: 08/23/2023  -     Lipid Panel; Future; Expected date: 08/23/2023  -     TSH; Future; Expected date: 08/23/2023    Disorders of glucose transport, unspecified  -     Hemoglobin A1C; Future; Expected date: 08/23/2023    Annual physical exam  -     CBC Auto Differential; Future; Expected date: 08/23/2023  -     Comprehensive Metabolic Panel; Future; Expected date: 08/23/2023  -     Lipid Panel; Future; Expected date: 08/23/2023  -     TSH; Future; Expected date: 08/23/2023  -     Hemoglobin A1C; Future; Expected date: 08/23/2023               Essential hypertension  -  patient blood pressure controlled today.   -  discuss use of his medications, has been holding amlodipine 2.5 mg in the evening yet blood pressures have been consistently well controlled, will DC this medication at this time.   - continue taking benazepril 40 mg daily for blood pressure control.    Scalp lesion:   - patient has small mole to anterior portion of scalp that would like to have Dermatology review.   - lesion is proximally 5 mm in size, has slight irregular color and shape and patient has felt it has been increasing in size so would be very reasonable to discuss with Dermatology.  -     Ambulatory referral/consult to Dermatology; Future; Expected date: 08/30/2023    Hyperlipidemia, unspecified hyperlipidemia type  - checking cholesterol and other labs. Including Lipids, CBC, CMP, and TSH.

## 2023-08-23 NOTE — PATIENT INSTRUCTIONS
Essential hypertension  -  patient blood pressure controlled today.   - discuss use of his medications, has been holding amlodipine 2.5 mg in the evening yet blood pressures have been consistently well controlled, will DC this medication at this time.   - continue taking benazepril 40 mg daily for blood pressure control.    Scalp lesion:   - patient has small mole to anterior portion of scalp that would like to have Dermatology review.   - lesion is proximally 5 mm in size, has slight irregular color and shape and patient has felt it has been increasing in size so would be very reasonable to discuss with Dermatology.  -     Ambulatory referral/consult to Dermatology; Future; Expected date: 08/30/2023    Hyperlipidemia, unspecified hyperlipidemia type  - checking cholesterol and other labs. Including Lipids, CBC, CMP, and TSH.

## 2023-09-02 ENCOUNTER — PATIENT MESSAGE (OUTPATIENT)
Dept: ADMINISTRATIVE | Facility: HOSPITAL | Age: 58
End: 2023-09-02
Payer: COMMERCIAL

## 2023-09-18 ENCOUNTER — PATIENT MESSAGE (OUTPATIENT)
Dept: PRIMARY CARE CLINIC | Facility: CLINIC | Age: 58
End: 2023-09-18
Payer: COMMERCIAL

## 2023-10-18 ENCOUNTER — PATIENT MESSAGE (OUTPATIENT)
Dept: CARDIOLOGY | Facility: CLINIC | Age: 58
End: 2023-10-18
Payer: COMMERCIAL

## 2023-11-02 ENCOUNTER — PATIENT MESSAGE (OUTPATIENT)
Dept: PRIMARY CARE CLINIC | Facility: CLINIC | Age: 58
End: 2023-11-02
Payer: COMMERCIAL

## 2023-11-02 DIAGNOSIS — T78.40XD ALLERGY, SUBSEQUENT ENCOUNTER: Primary | ICD-10-CM

## 2023-11-03 RX ORDER — CETIRIZINE HYDROCHLORIDE 10 MG/1
10 TABLET ORAL DAILY
Qty: 90 TABLET | Refills: 3 | Status: SHIPPED | OUTPATIENT
Start: 2023-11-03 | End: 2024-11-02

## 2023-11-11 DIAGNOSIS — I10 ESSENTIAL HYPERTENSION: ICD-10-CM

## 2023-11-11 NOTE — TELEPHONE ENCOUNTER
No care due was identified.  [facility] Embedded Care Due Messages. Reference number: [id number].   11/11/2023 8:59:21 AM CST

## 2023-11-13 RX ORDER — BENAZEPRIL HYDROCHLORIDE 40 MG/1
40 TABLET ORAL
Qty: 90 TABLET | Refills: 3 | Status: SHIPPED | OUTPATIENT
Start: 2023-11-13

## 2023-11-13 NOTE — TELEPHONE ENCOUNTER
Refill Routing Note   Medication(s) are not appropriate for processing by [facility] for the following reason(s):      Medication outside of protocol    [facility] action(s):  Route Care Due:  None identified              Appointments  past 12m or future 3m with PCP    Date Provider   Last Visit   8/23/2023 Andrez Vallejo MD   Next Visit   2/23/2024 Andrez Vallejo MD   ED visits in past 90 days: 0        Note composed:9:59 AM 11/13/2023

## 2024-02-06 DIAGNOSIS — Z12.11 COLON CANCER SCREENING: ICD-10-CM

## 2024-02-09 ENCOUNTER — PATIENT OUTREACH (OUTPATIENT)
Dept: ADMINISTRATIVE | Facility: HOSPITAL | Age: 59
End: 2024-02-09
Payer: COMMERCIAL

## 2024-02-09 NOTE — PROGRESS NOTES
Health Maintenance Due   Topic Date Due    COVID-19 Vaccine (1) Never done    Shingles Vaccine (2 of 2) 08/04/2022    Colorectal Cancer Screening  08/22/2023    Influenza Vaccine (1) 09/01/2023        Chart review done.    updated.   Immunizations reviewed & updated.   Care Everywhere updated.

## 2024-02-23 ENCOUNTER — OFFICE VISIT (OUTPATIENT)
Dept: PRIMARY CARE CLINIC | Facility: CLINIC | Age: 59
End: 2024-02-23
Payer: COMMERCIAL

## 2024-02-23 VITALS
RESPIRATION RATE: 16 BRPM | HEART RATE: 85 BPM | OXYGEN SATURATION: 98 % | SYSTOLIC BLOOD PRESSURE: 110 MMHG | WEIGHT: 181.75 LBS | HEIGHT: 68 IN | DIASTOLIC BLOOD PRESSURE: 68 MMHG | BODY MASS INDEX: 27.55 KG/M2 | TEMPERATURE: 98 F

## 2024-02-23 DIAGNOSIS — E78.5 HYPERLIPIDEMIA, UNSPECIFIED HYPERLIPIDEMIA TYPE: ICD-10-CM

## 2024-02-23 DIAGNOSIS — Z00.00 ANNUAL PHYSICAL EXAM: Primary | ICD-10-CM

## 2024-02-23 DIAGNOSIS — Z12.11 COLON CANCER SCREENING: ICD-10-CM

## 2024-02-23 DIAGNOSIS — I10 ESSENTIAL HYPERTENSION: ICD-10-CM

## 2024-02-23 DIAGNOSIS — E66.3 OVERWEIGHT (BMI 25.0-29.9): ICD-10-CM

## 2024-02-23 PROCEDURE — 4010F ACE/ARB THERAPY RXD/TAKEN: CPT | Mod: CPTII,S$GLB,, | Performed by: STUDENT IN AN ORGANIZED HEALTH CARE EDUCATION/TRAINING PROGRAM

## 2024-02-23 PROCEDURE — 99396 PREV VISIT EST AGE 40-64: CPT | Mod: S$GLB,,, | Performed by: STUDENT IN AN ORGANIZED HEALTH CARE EDUCATION/TRAINING PROGRAM

## 2024-02-23 PROCEDURE — 3008F BODY MASS INDEX DOCD: CPT | Mod: CPTII,S$GLB,, | Performed by: STUDENT IN AN ORGANIZED HEALTH CARE EDUCATION/TRAINING PROGRAM

## 2024-02-23 PROCEDURE — 3078F DIAST BP <80 MM HG: CPT | Mod: CPTII,S$GLB,, | Performed by: STUDENT IN AN ORGANIZED HEALTH CARE EDUCATION/TRAINING PROGRAM

## 2024-02-23 PROCEDURE — 3074F SYST BP LT 130 MM HG: CPT | Mod: CPTII,S$GLB,, | Performed by: STUDENT IN AN ORGANIZED HEALTH CARE EDUCATION/TRAINING PROGRAM

## 2024-02-23 PROCEDURE — 99999 PR PBB SHADOW E&M-EST. PATIENT-LVL IV: CPT | Mod: PBBFAC,,, | Performed by: STUDENT IN AN ORGANIZED HEALTH CARE EDUCATION/TRAINING PROGRAM

## 2024-02-23 PROCEDURE — 1159F MED LIST DOCD IN RCRD: CPT | Mod: CPTII,S$GLB,, | Performed by: STUDENT IN AN ORGANIZED HEALTH CARE EDUCATION/TRAINING PROGRAM

## 2024-02-23 PROCEDURE — 1160F RVW MEDS BY RX/DR IN RCRD: CPT | Mod: CPTII,S$GLB,, | Performed by: STUDENT IN AN ORGANIZED HEALTH CARE EDUCATION/TRAINING PROGRAM

## 2024-02-23 NOTE — PATIENT INSTRUCTIONS
"  Annual physical exam   - 58-year-old male, good state of health.  Taking medications as directed.    Essential hypertension   - patient blood pressure well controlled at this time.  110/70.   - takes benazepril 40 mg daily and tolerates well.    Hyperlipidemia, unspecified hyperlipidemia type   - patient cholesterol borderline, can recheck in 6 months.  Advised continued emphasis on eating well and controlling weight.    Colon cancer screening  -     Cologuard Screening (Multitarget Stool DNA); Future; Expected date: 02/23/2024  - patient would prefer Cologuard over fit test.  Sending Cologuard, will try to discontinue fit test.    Weight Loss:   - Body mass index is 27.64 kg/m².   - Normal weight is BMI 18-24.9.     - Overweight: 25-29.9  - Class 1 Obesity: 30-34.9  - Class 2 Obesity: 35-39.9  - Class 3 Obesity: 40+  - A BMI under 18 also shows diminished health outcomes.   - best health outcomes have been seen at a BMI of 22.    - excess weigh affects many body systems, including: cardiac, respiratory, GI, endocrine, musculoskeletal, dermatologic, reproductive, mental health and more.   - recommended moderate weight change, 1-2lbs per weeks.   - focus on eating a healthy sustainable diet.  Use food diary for at least a couple weeks to better understand what your diet.   - consider akbar such as "Lose It" or "Noom".   - avoid empty calories that you may use daily from items such as like soda, sweet tea, sugary coffee, ice cream, cake/pie, cookies/brownies/crackers or candy.  An occasional piece of birthday cake is not the cause of obesity, but a daily Frappaccino could be to blame.    - "Low Fat" on a box or bag should be eyed with caution, this fat it typically replaced with forms of sugar/carbs and the resultant product may be less healthy than other products.   - when possible eating whole foods is almost always preferable.  A diet of salads, green beans, broccoli, cauliflower, cucumbers, sweet potatoes, " peppers, olives/avocados, tomatoes, beats, berries, eggs, meat/fish, shrimp/crawfish with some limited fruit (apples/oranges/bananas/grapes) and even more limited grains/starches (pasta/rice/bread/potatoes/cereal) will serve you much better in the long term than eating a bunch of diet bars, shakes or powders.     - Exercise has many benefits (heart health, improved mood/energy, higher self esteem, less depression, greater strength/flexibility, better sleep, less stress/anxiety, improved immune system, stronger bones, improved cognition, fewer colds/asthma exacerbations), it also does help lose weight.  But weight loss from exercise is much less impactful than when a change in diet can achieve.  Exercise is highly encouraged, but diet change should be the primary tool used to lose weight.

## 2024-02-23 NOTE — PROGRESS NOTES
Subjective:           Patient ID: Marjorie Hopper   Age:  58 y.o.  Sex: male     Chief Complaint:   Follow-up (HTN)      History of Present Illness:    Marjorie Hopper is a 58 y.o. male who presents today with a chief complaint of Follow-up (HTN)  .    HTN controlled takes meds daily.  Watches BP at home, typically well controlled.    Getting some LUQ pain in abd at times.  Last month had 2 days of diarrhea.  Burning type pain.  Has been chronic / recurrent issues.    Cause has not been clear.     Does having hx of GERD.  Taking Tums x 2 tabs at night.  Raised head of bed a bit to help as well.    Has been doing some blended shakes to get more nutrients in.     The 10-year ASCVD risk score (Shilo WEST, et al., 2019) is: 6.5%    Values used to calculate the score:      Age: 58 years      Sex: Male      Is Non- : No      Diabetic: No      Tobacco smoker: No      Systolic Blood Pressure: 110 mmHg      Is BP treated: Yes      HDL Cholesterol: 55 mg/dL      Total Cholesterol: 233 mg/dL      Review of Systems   Constitutional:  Negative for activity change and unexpected weight change.   HENT:  Negative for hearing loss, rhinorrhea and trouble swallowing.    Eyes:  Negative for discharge and visual disturbance.   Respiratory:  Negative for chest tightness and wheezing.    Cardiovascular:  Positive for palpitations. Negative for chest pain.   Gastrointestinal:  Positive for abdominal pain (LUQ, mild/intermittent.). Negative for blood in stool, constipation, diarrhea and vomiting.   Endocrine: Negative for polydipsia and polyuria.   Genitourinary:  Negative for difficulty urinating, hematuria and urgency.   Musculoskeletal:  Negative for arthralgias, joint swelling and neck pain.   Neurological:  Negative for headaches.   Psychiatric/Behavioral:  Negative for confusion and dysphoric mood.            Objective:        Vitals:    02/23/24 1313   BP: 110/68   BP Location: Right arm   Patient Position:  "Sitting   BP Method: Medium (Manual)   Pulse: 85   Resp: 16   Temp: 97.8 °F (36.6 °C)   TempSrc: Temporal   SpO2: 98%   Weight: 82.5 kg (181 lb 12.3 oz)   Height: 5' 8" (1.727 m)       Body mass index is 27.64 kg/m².      Physical Exam  Vitals reviewed.   Constitutional:       General: He is not in acute distress.     Appearance: Normal appearance. He is not ill-appearing.      Comments: As per BMI.   HENT:      Head: Normocephalic and atraumatic.      Right Ear: Tympanic membrane and external ear normal.      Left Ear: Tympanic membrane and external ear normal.      Nose: Nose normal.   Eyes:      Extraocular Movements: Extraocular movements intact.      Conjunctiva/sclera: Conjunctivae normal.   Cardiovascular:      Rate and Rhythm: Normal rate and regular rhythm.      Pulses: Normal pulses.      Heart sounds: No murmur heard.  Pulmonary:      Effort: Pulmonary effort is normal. No respiratory distress.      Breath sounds: No wheezing.   Abdominal:      Tenderness: There is no right CVA tenderness or left CVA tenderness.   Musculoskeletal:         General: No swelling or deformity.      Cervical back: Normal range of motion.      Right lower leg: No edema.      Left lower leg: No edema.   Lymphadenopathy:      Cervical: No cervical adenopathy.   Skin:     Capillary Refill: Capillary refill takes less than 2 seconds.      Findings: Lesion (mole to top of scalp.) present.   Neurological:      General: No focal deficit present.      Mental Status: He is alert and oriented to person, place, and time.      Gait: Gait normal.   Psychiatric:         Mood and Affect: Mood normal.           Past Medical History:   Diagnosis Date    Hypertension        Lab Results   Component Value Date     08/24/2023    K 4.3 08/24/2023     08/24/2023    CO2 24 08/24/2023    BUN 18 08/24/2023    CREATININE 0.9 08/24/2023    ANIONGAP 9 08/24/2023     Lab Results   Component Value Date    HGBA1C 5.0 08/24/2023     No results " "found for: "BNP", "BNPTRIAGEBLO"    Lab Results   Component Value Date    WBC 5.45 08/24/2023    HGB 15.4 08/24/2023    HCT 46.3 08/24/2023     08/24/2023    GRAN 2.6 08/24/2023    GRAN 48.3 08/24/2023     Lab Results   Component Value Date    CHOL 233 (H) 08/24/2023    HDL 55 08/24/2023    LDLCALC 149.6 08/24/2023    TRIG 142 08/24/2023        Outpatient Encounter Medications as of 2/23/2024   Medication Sig Dispense Refill    benazepriL (LOTENSIN) 40 MG tablet TAKE 1 TABLET(40 MG) BY MOUTH EVERY DAY 90 tablet 3    cetirizine (ZYRTEC) 10 MG tablet Take 1 tablet (10 mg total) by mouth once daily. 90 tablet 3     No facility-administered encounter medications on file as of 2/23/2024.          Assessment:       1. Annual physical exam    2. Essential hypertension    3. Hyperlipidemia, unspecified hyperlipidemia type    4. Colon cancer screening    5. Overweight (BMI 25.0-29.9)           Plan:       Annual physical exam    Essential hypertension    Hyperlipidemia, unspecified hyperlipidemia type    Colon cancer screening  -     Cologuard Screening (Multitarget Stool DNA); Future; Expected date: 02/23/2024    Overweight (BMI 25.0-29.9)                Annual physical exam   - 58-year-old male, good state of health.  Taking medications as directed.    Essential hypertension   - patient blood pressure well controlled at this time.  110/70.   - takes benazepril 40 mg daily and tolerates well.    Hyperlipidemia, unspecified hyperlipidemia type   - patient cholesterol borderline, can recheck in 6 months.  Advised continued emphasis on eating well and controlling weight.    Colon cancer screening  -     Cologuard Screening (Multitarget Stool DNA); Future; Expected date: 02/23/2024  - patient would prefer Cologuard over fit test.  Sending Cologuard, will try to discontinue fit test.    Weight Loss:   - Body mass index is 27.64 kg/m².   - Normal weight is BMI 18-24.9.     - Overweight: 25-29.9  - Class 1 Obesity: " "30-34.9  - Class 2 Obesity: 35-39.9  - Class 3 Obesity: 40+  - A BMI under 18 also shows diminished health outcomes.   - best health outcomes have been seen at a BMI of 22.    - excess weigh affects many body systems, including: cardiac, respiratory, GI, endocrine, musculoskeletal, dermatologic, reproductive, mental health and more.   - recommended moderate weight change, 1-2lbs per weeks.   - focus on eating a healthy sustainable diet.  Use food diary for at least a couple weeks to better understand what your diet.   - consider akbar such as "Lose It" or "Noom".   - avoid empty calories that you may use daily from items such as like soda, sweet tea, sugary coffee, ice cream, cake/pie, cookies/brownies/crackers or candy.  An occasional piece of birthday cake is not the cause of obesity, but a daily Frappaccino could be to blame.    - "Low Fat" on a box or bag should be eyed with caution, this fat it typically replaced with forms of sugar/carbs and the resultant product may be less healthy than other products.   - when possible eating whole foods is almost always preferable.  A diet of salads, green beans, broccoli, cauliflower, cucumbers, sweet potatoes, peppers, olives/avocados, tomatoes, beats, berries, eggs, meat/fish, shrimp/crawfish with some limited fruit (apples/oranges/bananas/grapes) and even more limited grains/starches (pasta/rice/bread/potatoes/cereal) will serve you much better in the long term than eating a bunch of diet bars, shakes or powders.     - Exercise has many benefits (heart health, improved mood/energy, higher self esteem, less depression, greater strength/flexibility, better sleep, less stress/anxiety, improved immune system, stronger bones, improved cognition, fewer colds/asthma exacerbations), it also does help lose weight.  But weight loss from exercise is much less impactful than when a change in diet can achieve.  Exercise is highly encouraged, but diet change should be the primary tool " used to lose weight.

## 2024-03-15 LAB — NONINV COLON CA DNA+OCC BLD SCRN STL QL: NEGATIVE

## 2024-09-19 ENCOUNTER — PATIENT MESSAGE (OUTPATIENT)
Dept: PRIMARY CARE CLINIC | Facility: CLINIC | Age: 59
End: 2024-09-19
Payer: COMMERCIAL

## 2024-11-13 ENCOUNTER — PATIENT MESSAGE (OUTPATIENT)
Dept: PRIMARY CARE CLINIC | Facility: CLINIC | Age: 59
End: 2024-11-13
Payer: COMMERCIAL

## 2024-11-13 DIAGNOSIS — I10 ESSENTIAL HYPERTENSION: ICD-10-CM

## 2024-11-13 RX ORDER — BENAZEPRIL HYDROCHLORIDE 40 MG/1
40 TABLET ORAL DAILY
Qty: 90 TABLET | Refills: 0 | Status: SHIPPED | OUTPATIENT
Start: 2024-11-13

## 2024-11-13 RX ORDER — BENAZEPRIL HYDROCHLORIDE 40 MG/1
40 TABLET ORAL
Qty: 90 TABLET | Refills: 3 | OUTPATIENT
Start: 2024-11-13

## 2024-11-13 NOTE — TELEPHONE ENCOUNTER
Provider Staff:  Action required for this patient    Requires labs      Please see care gap opportunities below in Care Due Message.    Thanks!  [facility]     Appointments      Date Provider   Last Visit   2/23/2024 Andrez Vallejo MD   Next Visit   11/13/2024 Andrez Vallejo MD     Refill Decision Note   Marjorie Hopper  is requesting a refill authorization.  Brief Assessment and Rationale for Refill:  Quick Discontinue     Medication Therapy Plan:         Comments:     Note composed:2:47 PM 11/13/2024

## 2024-11-13 NOTE — TELEPHONE ENCOUNTER
Refill Routing Note   Medication(s) are not appropriate for processing by [facility] for the following reason(s):        Required labs outdated: CMP    [facility] action(s):  Defer             Appointments  past 12m or future 3m with PCP    Date Provider   Last Visit   2/23/2024 nAdrez Vallejo MD   Next Visit   Visit date not found Andrez Vallejo MD   ED visits in past 90 days: 0        Note composed:3:51 PM 11/13/2024

## 2024-11-13 NOTE — TELEPHONE ENCOUNTER
Refill Routing Note   Medication(s) are not appropriate for processing by [facility] for the following reason(s):        Required labs outdated    [facility] action(s):  Defer             Appointments  past 12m or future 3m with PCP    Date Provider   Last Visit   2/23/2024 Andrez Vallejo MD   Next Visit   Visit date not found Andrez Vallejo MD   ED visits in past 90 days: 0        Note composed:2:46 PM 11/13/2024

## 2024-11-13 NOTE — TELEPHONE ENCOUNTER
----- Message from Chelle sent at 11/13/2024  3:08 PM CST -----  Contact: [phone]  Requesting an RX refill or new RX.    Is this a refill or new RX: refill     RX name and strength (benazepriL (LOTENSIN) 40 MG tablet  Is this a 30 day or 90 day RX: 90    Pharmacy name and phone #      [id number] - [place] - [address] AT [facility] [address]  [address] [address]  Phone: [phone] Fax: [phone]    Patient is out of the medication

## 2024-11-13 NOTE — TELEPHONE ENCOUNTER
No care due was identified.  [facility] Embedded Care Due Messages. Reference number: [id number].   11/13/2024 2:38:04 PM CST

## 2024-11-13 NOTE — TELEPHONE ENCOUNTER
Care Due:                  Date            Visit Type   Department     Provider  --------------------------------------------------------------------------------                                EP -                              PRIMARY      [facility]  Last Visit: 02-      CARE ([facility])   PRIMARY CARE   Andrez Vallejo  Next Visit: None Scheduled  None         None Found                                                            Last  Test          Frequency    Reason                     Performed    Due Date  --------------------------------------------------------------------------------    CMP.........  12 months..  benazepriL...............  08- 08-    [facility] Embedded Care Due Messages. Reference number: [id number].   11/13/2024 2:36:03 PM CST

## 2025-02-10 DIAGNOSIS — I10 ESSENTIAL HYPERTENSION: ICD-10-CM

## 2025-02-10 RX ORDER — BENAZEPRIL HYDROCHLORIDE 40 MG/1
40 TABLET ORAL DAILY
Qty: 30 TABLET | Refills: 0 | Status: SHIPPED | OUTPATIENT
Start: 2025-02-10

## 2025-02-10 NOTE — TELEPHONE ENCOUNTER
Please call to get scheduled for a visit with myself or dr cohn. I will send in 30 day supply of medication but this is the last refill until seen by a provider. Thanks

## 2025-03-14 ENCOUNTER — OFFICE VISIT (OUTPATIENT)
Dept: PRIMARY CARE CLINIC | Facility: CLINIC | Age: 60
End: 2025-03-14
Payer: COMMERCIAL

## 2025-03-14 VITALS
SYSTOLIC BLOOD PRESSURE: 128 MMHG | OXYGEN SATURATION: 98 % | BODY MASS INDEX: 28.8 KG/M2 | HEART RATE: 103 BPM | HEIGHT: 68 IN | DIASTOLIC BLOOD PRESSURE: 83 MMHG | TEMPERATURE: 98 F | WEIGHT: 190.06 LBS

## 2025-03-14 DIAGNOSIS — I10 ESSENTIAL HYPERTENSION: ICD-10-CM

## 2025-03-14 DIAGNOSIS — J02.0 STREPTOCOCCAL PHARYNGITIS: Primary | ICD-10-CM

## 2025-03-14 LAB
CTP QC/QA: YES
POC MOLECULAR INFLUENZA A AGN: NEGATIVE
POC MOLECULAR INFLUENZA B AGN: NEGATIVE
S PYO RRNA THROAT QL PROBE: POSITIVE
SARS-COV-2 RDRP RESP QL NAA+PROBE: NEGATIVE

## 2025-03-14 PROCEDURE — 99999 PR PBB SHADOW E&M-EST. PATIENT-LVL IV: CPT | Mod: PBBFAC,,,

## 2025-03-14 RX ORDER — BENAZEPRIL HYDROCHLORIDE 40 MG/1
40 TABLET ORAL DAILY
Qty: 90 TABLET | Refills: 1 | Status: SHIPPED | OUTPATIENT
Start: 2025-03-14 | End: 2025-03-14 | Stop reason: ALTCHOICE

## 2025-03-14 RX ORDER — PROMETHAZINE HYDROCHLORIDE AND DEXTROMETHORPHAN HYDROBROMIDE 6.25; 15 MG/5ML; MG/5ML
5 SYRUP ORAL EVERY 4 HOURS PRN
Qty: 118 ML | Refills: 0 | Status: SHIPPED | OUTPATIENT
Start: 2025-03-14

## 2025-03-14 RX ORDER — FLUTICASONE PROPIONATE 50 MCG
1 SPRAY, SUSPENSION (ML) NASAL DAILY
Qty: 16 G | Refills: 5 | Status: SHIPPED | OUTPATIENT
Start: 2025-03-14

## 2025-03-14 RX ORDER — AMOXICILLIN 500 MG/1
500 TABLET, FILM COATED ORAL EVERY 12 HOURS
Qty: 20 TABLET | Refills: 0 | Status: SHIPPED | OUTPATIENT
Start: 2025-03-14 | End: 2025-03-24

## 2025-03-14 RX ORDER — VALSARTAN 320 MG/1
320 TABLET ORAL DAILY
Qty: 90 TABLET | Refills: 1 | Status: SHIPPED | OUTPATIENT
Start: 2025-03-14

## 2025-03-14 NOTE — PROGRESS NOTES
Clinic Note  3/14/2025      Subjective:       Patient ID:  Marjorie is a 59 y.o. male being seen for an established visit.    Chief Complaint: Sore Throat    Patient presents to clinic today for sore throat     This is a new problem. The current episode started yesterday. The problem has been gradually worsening. The maximum temperature recorded prior to his arrival was 100.4 - 100.9 F. The fever has been present for Less than 1 day. The cough is Dry(dry cough for months). Associated symptoms include congestion, coughing, diarrhea (loose stools x2 this morning), rhinorrhea, sneezing and a sore throat. Pertinent negatives include no abdominal pain, chest pain, conjunctivitis, dysuria, ear pain, headaches, joint pain, joint swelling, nausea, neck pain, plugged ear sensation, rash, sinus pain, swollen glands, vomiting or wheezing. He has tried NSAIDs (throat spray) for the symptoms. The treatment provided mild relief.     HTN- BP controlled , on benazepril, reports chronic dry cough, advise switching to ARB     Labs due - pt is non fasting     Patient denies any other concerns today       Review of Systems   Constitutional:  Positive for chills, fatigue and fever (100.7). Negative for appetite change and unexpected weight change.   HENT:  Positive for congestion, postnasal drip, rhinorrhea, sinus pressure (maxillary), sneezing, sore throat and tinnitus. Negative for ear discharge, ear pain, hearing loss and sinus pain.    Eyes:  Positive for itching. Negative for pain, discharge and visual disturbance.   Respiratory:  Positive for cough (dry cough for months). Negative for shortness of breath and wheezing.    Cardiovascular:  Negative for chest pain, palpitations and leg swelling.   Gastrointestinal:  Positive for diarrhea (loose stools x2 this morning). Negative for abdominal pain, blood in stool, constipation, nausea and vomiting.   Genitourinary:  Negative for dysuria, frequency and hematuria.   Musculoskeletal:   "Positive for myalgias. Negative for arthralgias and neck pain.   Skin:  Negative for rash.   Neurological:  Negative for dizziness and headaches.   Psychiatric/Behavioral:  Negative for suicidal ideas.         Medication List with Changes/Refills   New Medications    AMOXICILLIN (AMOXIL) 500 MG TAB    Take 1 tablet (500 mg total) by mouth every 12 (twelve) hours. Take with food for 10 days    FLUTICASONE PROPIONATE (FLONASE) 50 MCG/ACTUATION NASAL SPRAY    1 spray (50 mcg total) by Each Nostril route once daily.    PROMETHAZINE-DEXTROMETHORPHAN (PROMETHAZINE-DM) 6.25-15 MG/5 ML SYRP    Take 5 mLs by mouth every 4 (four) hours as needed (congestion).    VALSARTAN (DIOVAN) 320 MG TABLET    Take 1 tablet (320 mg total) by mouth once daily.   Discontinued Medications    BENAZEPRIL (LOTENSIN) 40 MG TABLET    Take 1 tablet (40 mg total) by mouth once daily.    CETIRIZINE (ZYRTEC) 10 MG TABLET    Take 1 tablet (10 mg total) by mouth once daily.       Problem List[1]        Objective:      /83 (BP Location: Right arm, Patient Position: Sitting)   Pulse 103   Temp 98.4 °F (36.9 °C) (Oral)   Ht 5' 8" (1.727 m)   Wt 86.2 kg (190 lb 0.6 oz)   SpO2 98%   BMI 28.89 kg/m²   Estimated body mass index is 28.89 kg/m² as calculated from the following:    Height as of this encounter: 5' 8" (1.727 m).    Weight as of this encounter: 86.2 kg (190 lb 0.6 oz).  Physical Exam  Vitals and nursing note reviewed.   Constitutional:       General: He is not in acute distress.  HENT:      Head: Atraumatic.      Right Ear: A middle ear effusion (non infectious) is present.      Left Ear: A middle ear effusion (non infectious) is present.      Mouth/Throat:      Pharynx: Pharyngeal swelling, posterior oropharyngeal erythema, uvula swelling and postnasal drip present. No oropharyngeal exudate.      Tonsils: No tonsillar exudate or tonsillar abscesses.      Comments: Mallampati 2/3  Cardiovascular:      Rate and Rhythm: Normal rate and " regular rhythm.      Heart sounds: No murmur heard.  Pulmonary:      Effort: Pulmonary effort is normal. No respiratory distress.      Breath sounds: Normal breath sounds. No wheezing, rhonchi or rales.   Musculoskeletal:         General: Normal range of motion.      Right lower leg: No edema.      Left lower leg: No edema.   Lymphadenopathy:      Cervical: Cervical adenopathy present.   Neurological:      Mental Status: He is alert and oriented to person, place, and time.      Gait: Gait normal.   Psychiatric:         Mood and Affect: Mood normal.         Thought Content: Thought content normal.             Assessment and Plan:   -Strep Throat   Symptom management , antibiotics, strep throat education provided     -HTN  Dry cough for months, switch ACE to ARB , rtc in 2 weeks for nurse visit for assessment of BP and side effects, DASH Diet     Labs due- last labs in 8/2023, advised labs today      Problem List Items Addressed This Visit       Essential hypertension    Relevant Medications    valsartan (DIOVAN) 320 MG tablet    Other Relevant Orders    CBC Auto Differential    Comprehensive Metabolic Panel    Hemoglobin A1C    Lipid Panel    TSH     Other Visit Diagnoses         Streptococcal pharyngitis    -  Primary    Relevant Medications    fluticasone propionate (FLONASE) 50 mcg/actuation nasal spray    promethazine-dextromethorphan (PROMETHAZINE-DM) 6.25-15 mg/5 mL Syrp    amoxicillin (AMOXIL) 500 MG Tab    Other Relevant Orders    POCT COVID-19 Rapid Screening (Completed)    POCT Influenza A/B Molecular (Completed)    POCT Rapid Strep A (Completed)            Reviewed risks, benefits, and appropriate medication use prescribed  Discussed LS changes as appropriate   Reviewed cancer screening guidelines   Advised to keep speciality and follow up appointments as scheduled   Reviewed ER precautions   Verbalized understanding and is agreeable to plan      Follow Up:   Follow up in 6 months (on 9/14/2025), or if  symptoms worsen or fail to improve, for HTN, 2 week bp nurse visit.    Other Orders Placed This Visit:  Orders Placed This Encounter   Procedures    CBC Auto Differential    Comprehensive Metabolic Panel    Hemoglobin A1C    Lipid Panel    TSH    POCT COVID-19 Rapid Screening    POCT Influenza A/B Molecular    POCT Rapid Strep A           SARA Dao              [1]   Patient Active Problem List  Diagnosis    Right knee pain    Primary osteoarthritis of right knee    Raynaud's phenomenon    Hyperlipidemia    Essential hypertension    Anxiety disorder

## 2025-03-17 ENCOUNTER — RESULTS FOLLOW-UP (OUTPATIENT)
Dept: PRIMARY CARE CLINIC | Facility: CLINIC | Age: 60
End: 2025-03-17
Payer: COMMERCIAL

## 2025-03-17 DIAGNOSIS — E78.5 HYPERLIPIDEMIA, UNSPECIFIED HYPERLIPIDEMIA TYPE: Primary | ICD-10-CM

## 2025-03-17 RX ORDER — PRAVASTATIN SODIUM 20 MG/1
20 TABLET ORAL DAILY
Qty: 90 TABLET | Refills: 1 | Status: SHIPPED | OUTPATIENT
Start: 2025-03-17

## 2025-03-28 ENCOUNTER — CLINICAL SUPPORT (OUTPATIENT)
Dept: PRIMARY CARE CLINIC | Facility: CLINIC | Age: 60
End: 2025-03-28
Payer: COMMERCIAL

## 2025-03-28 VITALS
DIASTOLIC BLOOD PRESSURE: 86 MMHG | RESPIRATION RATE: 14 BRPM | BODY MASS INDEX: 28.89 KG/M2 | TEMPERATURE: 98 F | OXYGEN SATURATION: 96 % | SYSTOLIC BLOOD PRESSURE: 138 MMHG | HEIGHT: 68 IN

## 2025-03-28 DIAGNOSIS — Z01.30 BP CHECK: Primary | ICD-10-CM

## 2025-03-28 PROCEDURE — 99999 PR PBB SHADOW E&M-EST. PATIENT-LVL III: CPT | Mod: PBBFAC,,,

## 2025-03-28 NOTE — PROGRESS NOTES
Pt arrived in clinic for blood pressure check. No chief complaints or pain reported. Pt's vitals as follows: /86 repeated /84, P 78, R 14, T 98.1, and O2 saturation 96. Pt verbalized understanding. MD notified.     No-Patient/Caregiver offered and refused free interpretation services.

## 2025-09-05 ENCOUNTER — OFFICE VISIT (OUTPATIENT)
Dept: PRIMARY CARE CLINIC | Facility: CLINIC | Age: 60
End: 2025-09-05
Payer: COMMERCIAL

## 2025-09-05 VITALS
SYSTOLIC BLOOD PRESSURE: 130 MMHG | OXYGEN SATURATION: 98 % | HEIGHT: 68 IN | DIASTOLIC BLOOD PRESSURE: 80 MMHG | BODY MASS INDEX: 28.85 KG/M2 | HEART RATE: 76 BPM | WEIGHT: 190.38 LBS

## 2025-09-05 DIAGNOSIS — Z00.00 ANNUAL PHYSICAL EXAM: Primary | ICD-10-CM

## 2025-09-05 DIAGNOSIS — E78.5 HYPERLIPIDEMIA, UNSPECIFIED HYPERLIPIDEMIA TYPE: ICD-10-CM

## 2025-09-05 DIAGNOSIS — K21.9 GASTROESOPHAGEAL REFLUX DISEASE, UNSPECIFIED WHETHER ESOPHAGITIS PRESENT: ICD-10-CM

## 2025-09-05 DIAGNOSIS — R07.89 CHEST DISCOMFORT: ICD-10-CM

## 2025-09-05 DIAGNOSIS — I10 ESSENTIAL HYPERTENSION: ICD-10-CM

## 2025-09-05 DIAGNOSIS — R05.3 CHRONIC COUGH: ICD-10-CM

## 2025-09-05 PROCEDURE — 99999 PR PBB SHADOW E&M-EST. PATIENT-LVL IV: CPT | Mod: PBBFAC,,, | Performed by: STUDENT IN AN ORGANIZED HEALTH CARE EDUCATION/TRAINING PROGRAM

## 2025-09-05 RX ORDER — VALSARTAN 320 MG/1
320 TABLET ORAL DAILY
Qty: 90 TABLET | Refills: 3 | Status: SHIPPED | OUTPATIENT
Start: 2025-09-05

## 2025-09-05 RX ORDER — FAMOTIDINE 40 MG/1
40 TABLET, FILM COATED ORAL NIGHTLY PRN
Qty: 30 TABLET | Refills: 11 | Status: SHIPPED | OUTPATIENT
Start: 2025-09-05 | End: 2026-09-05

## 2025-09-05 RX ORDER — PRAVASTATIN SODIUM 20 MG/1
20 TABLET ORAL DAILY
Qty: 90 TABLET | Refills: 3 | Status: SHIPPED | OUTPATIENT
Start: 2025-09-05